# Patient Record
Sex: FEMALE | Race: WHITE | Employment: UNEMPLOYED | URBAN - METROPOLITAN AREA
[De-identification: names, ages, dates, MRNs, and addresses within clinical notes are randomized per-mention and may not be internally consistent; named-entity substitution may affect disease eponyms.]

---

## 2018-01-02 ENCOUNTER — ED HISTORICAL/CONVERTED ENCOUNTER (OUTPATIENT)
Dept: OTHER | Age: 38
End: 2018-01-02

## 2018-02-24 ENCOUNTER — ED HISTORICAL/CONVERTED ENCOUNTER (OUTPATIENT)
Dept: OTHER | Age: 38
End: 2018-02-24

## 2018-12-06 ENCOUNTER — ED HISTORICAL/CONVERTED ENCOUNTER (OUTPATIENT)
Dept: OTHER | Age: 38
End: 2018-12-06

## 2018-12-21 ENCOUNTER — ED HISTORICAL/CONVERTED ENCOUNTER (OUTPATIENT)
Dept: OTHER | Age: 38
End: 2018-12-21

## 2020-07-19 ENCOUNTER — ED HISTORICAL/CONVERTED ENCOUNTER (OUTPATIENT)
Dept: OTHER | Age: 40
End: 2020-07-19

## 2021-01-21 VITALS
WEIGHT: 255.6 LBS | BODY MASS INDEX: 42.59 KG/M2 | SYSTOLIC BLOOD PRESSURE: 130 MMHG | TEMPERATURE: 98.5 F | HEIGHT: 65 IN | DIASTOLIC BLOOD PRESSURE: 80 MMHG | OXYGEN SATURATION: 99 % | HEART RATE: 90 BPM | RESPIRATION RATE: 18 BRPM

## 2021-01-21 DIAGNOSIS — Z80.9 FAMILY HISTORY OF CANCER: ICD-10-CM

## 2021-01-21 PROBLEM — R03.0 ELEVATED BLOOD PRESSURE READING: Status: ACTIVE | Noted: 2021-01-21

## 2021-01-21 PROBLEM — Z78.9 WEIGHT LOSS ADVISED: Status: ACTIVE | Noted: 2021-01-21

## 2021-01-21 PROBLEM — Z87.81 HISTORY OF FRACTURE OF ANKLE: Status: ACTIVE | Noted: 2021-01-21

## 2021-01-21 PROBLEM — Z51.81 MEDICATION MONITORING ENCOUNTER: Status: ACTIVE | Noted: 2021-01-21

## 2021-01-21 PROBLEM — G47.10 DAYTIME HYPERSOMNIA: Status: ACTIVE | Noted: 2021-01-21

## 2021-01-21 PROBLEM — E55.9 VITAMIN D DEFICIENCY: Status: ACTIVE | Noted: 2021-01-21

## 2021-01-21 PROBLEM — R73.03 PREDIABETES: Status: ACTIVE | Noted: 2021-01-21

## 2021-01-21 PROBLEM — G47.00 INSOMNIA: Status: ACTIVE | Noted: 2021-01-21

## 2021-01-21 PROBLEM — E66.01 MORBID OBESITY (HCC): Status: ACTIVE | Noted: 2021-01-21

## 2021-01-21 RX ORDER — GINGER ROOT/GINGER ROOT EXT 262.5 MG
CAPSULE ORAL
COMMUNITY

## 2021-01-21 RX ORDER — NORGESTIMATE AND ETHINYL ESTRADIOL 0.25-0.035
1 KIT ORAL DAILY
COMMUNITY

## 2021-09-09 ENCOUNTER — HOSPITAL ENCOUNTER (EMERGENCY)
Age: 41
Discharge: HOME | End: 2021-09-10
Payer: COMMERCIAL

## 2021-09-09 ENCOUNTER — APPOINTMENT (OUTPATIENT)
Dept: GENERAL RADIOLOGY | Age: 41
End: 2021-09-09
Attending: EMERGENCY MEDICINE
Payer: COMMERCIAL

## 2021-09-09 VITALS
BODY MASS INDEX: 42.68 KG/M2 | TEMPERATURE: 99 F | SYSTOLIC BLOOD PRESSURE: 118 MMHG | HEIGHT: 64 IN | OXYGEN SATURATION: 96 % | HEART RATE: 118 BPM | WEIGHT: 250 LBS | DIASTOLIC BLOOD PRESSURE: 75 MMHG | RESPIRATION RATE: 22 BRPM

## 2021-09-09 DIAGNOSIS — U07.1 PNEUMONIA DUE TO COVID-19 VIRUS: Primary | ICD-10-CM

## 2021-09-09 DIAGNOSIS — J12.82 PNEUMONIA DUE TO COVID-19 VIRUS: Primary | ICD-10-CM

## 2021-09-09 LAB
ALBUMIN SERPL-MCNC: 3.5 G/DL (ref 3.5–5)
ALBUMIN/GLOB SERPL: 0.7 {RATIO} (ref 1.1–2.2)
ALP SERPL-CCNC: 72 U/L (ref 45–117)
ALT SERPL-CCNC: 66 U/L (ref 12–78)
ANION GAP SERPL CALC-SCNC: 7 MMOL/L (ref 5–15)
AST SERPL W P-5'-P-CCNC: 100 U/L (ref 15–37)
BASOPHILS # BLD: 0 K/UL (ref 0–0.1)
BASOPHILS NFR BLD: 1 % (ref 0–1)
BILIRUB SERPL-MCNC: 0.3 MG/DL (ref 0.2–1)
BUN SERPL-MCNC: 10 MG/DL (ref 6–20)
BUN/CREAT SERPL: 14 (ref 12–20)
CA-I BLD-MCNC: 8.2 MG/DL (ref 8.5–10.1)
CHLORIDE SERPL-SCNC: 99 MMOL/L (ref 97–108)
CO2 SERPL-SCNC: 28 MMOL/L (ref 21–32)
COVID-19 RAPID TEST, COVR: DETECTED
CREAT SERPL-MCNC: 0.74 MG/DL (ref 0.55–1.02)
DIFFERENTIAL METHOD BLD: ABNORMAL
EOSINOPHIL # BLD: 0 K/UL (ref 0–0.4)
EOSINOPHIL NFR BLD: 0 % (ref 0–7)
ERYTHROCYTE [DISTWIDTH] IN BLOOD BY AUTOMATED COUNT: 13.9 % (ref 11.5–14.5)
GLOBULIN SER CALC-MCNC: 4.7 G/DL (ref 2–4)
GLUCOSE SERPL-MCNC: 140 MG/DL (ref 65–100)
HCT VFR BLD AUTO: 43.9 % (ref 35–47)
HGB BLD-MCNC: 13.7 G/DL (ref 11.5–16)
IMM GRANULOCYTES # BLD AUTO: 0.1 K/UL (ref 0–0.04)
IMM GRANULOCYTES NFR BLD AUTO: 1 % (ref 0–0.5)
LACTATE SERPL-SCNC: 1.4 MMOL/L (ref 0.4–2)
LYMPHOCYTES # BLD: 0.8 K/UL (ref 0.8–3.5)
LYMPHOCYTES NFR BLD: 18 % (ref 12–49)
MCH RBC QN AUTO: 25.9 PG (ref 26–34)
MCHC RBC AUTO-ENTMCNC: 31.2 G/DL (ref 30–36.5)
MCV RBC AUTO: 83 FL (ref 80–99)
MONOCYTES # BLD: 0.4 K/UL (ref 0–1)
MONOCYTES NFR BLD: 8 % (ref 5–13)
NEUTS SEG # BLD: 3.2 K/UL (ref 1.8–8)
NEUTS SEG NFR BLD: 72 % (ref 32–75)
NRBC # BLD: 0 K/UL (ref 0–0.01)
NRBC BLD-RTO: 0 PER 100 WBC
PLATELET # BLD AUTO: 216 K/UL (ref 150–400)
PMV BLD AUTO: 12.1 FL (ref 8.9–12.9)
POTASSIUM SERPL-SCNC: 3.5 MMOL/L (ref 3.5–5.1)
PROT SERPL-MCNC: 8.2 G/DL (ref 6.4–8.2)
RBC # BLD AUTO: 5.29 M/UL (ref 3.8–5.2)
SARS-COV-2, COV2: NORMAL
SODIUM SERPL-SCNC: 134 MMOL/L (ref 136–145)
SPECIMEN SOURCE: ABNORMAL
TROPONIN I SERPL-MCNC: <0.05 NG/ML
WBC # BLD AUTO: 4.4 K/UL (ref 3.6–11)

## 2021-09-09 PROCEDURE — 85025 COMPLETE CBC W/AUTO DIFF WBC: CPT

## 2021-09-09 PROCEDURE — 74011250636 HC RX REV CODE- 250/636: Performed by: EMERGENCY MEDICINE

## 2021-09-09 PROCEDURE — 80053 COMPREHEN METABOLIC PANEL: CPT

## 2021-09-09 PROCEDURE — 83605 ASSAY OF LACTIC ACID: CPT

## 2021-09-09 PROCEDURE — 93005 ELECTROCARDIOGRAM TRACING: CPT

## 2021-09-09 PROCEDURE — 84484 ASSAY OF TROPONIN QUANT: CPT

## 2021-09-09 PROCEDURE — 71045 X-RAY EXAM CHEST 1 VIEW: CPT

## 2021-09-09 PROCEDURE — 74011250637 HC RX REV CODE- 250/637: Performed by: EMERGENCY MEDICINE

## 2021-09-09 PROCEDURE — 87040 BLOOD CULTURE FOR BACTERIA: CPT

## 2021-09-09 PROCEDURE — 99284 EMERGENCY DEPT VISIT MOD MDM: CPT

## 2021-09-09 PROCEDURE — 87635 SARS-COV-2 COVID-19 AMP PRB: CPT

## 2021-09-09 PROCEDURE — 36415 COLL VENOUS BLD VENIPUNCTURE: CPT

## 2021-09-09 RX ORDER — ALBUTEROL SULFATE 90 UG/1
2 AEROSOL, METERED RESPIRATORY (INHALATION)
Qty: 6.7 G | Refills: 0 | Status: SHIPPED | OUTPATIENT
Start: 2021-09-09 | End: 2021-09-10 | Stop reason: SDUPTHER

## 2021-09-09 RX ORDER — ACETAMINOPHEN 500 MG
1000 TABLET ORAL
Status: COMPLETED | OUTPATIENT
Start: 2021-09-09 | End: 2021-09-09

## 2021-09-09 RX ORDER — DEXAMETHASONE 6 MG/1
6 TABLET ORAL
Qty: 5 TABLET | Refills: 0 | Status: SHIPPED | OUTPATIENT
Start: 2021-09-09 | End: 2021-09-10 | Stop reason: SDUPTHER

## 2021-09-09 RX ORDER — LORATADINE AND PSEUDOEPHEDRINE SULFATE 10; 240 MG/1; MG/1
1 TABLET, EXTENDED RELEASE ORAL DAILY
Qty: 14 TABLET | Refills: 0 | Status: SHIPPED | OUTPATIENT
Start: 2021-09-09 | End: 2021-09-10 | Stop reason: SDUPTHER

## 2021-09-09 RX ORDER — PROMETHAZINE HYDROCHLORIDE AND DEXTROMETHORPHAN HYDROBROMIDE 6.25; 15 MG/5ML; MG/5ML
5 SYRUP ORAL
Qty: 120 ML | Refills: 0 | Status: SHIPPED | OUTPATIENT
Start: 2021-09-09 | End: 2021-09-10 | Stop reason: SDUPTHER

## 2021-09-09 RX ADMIN — ACETAMINOPHEN 1000 MG: 500 TABLET ORAL at 20:06

## 2021-09-09 RX ADMIN — SODIUM CHLORIDE 1000 ML: 9 INJECTION, SOLUTION INTRAVENOUS at 20:34

## 2021-09-09 NOTE — LETTER
[facility] EMERGENCY DEPT  [address] [address]  [phone]    Work/School Note    Date: 9/9/2021     To Whom It May concern:    Arron Gonsales was evaulated by the following provider(s):  Nurse Practitioner: Mihir Quintanilla NP.   Karli Hayden virus is suspected. Per the CDC guidelines we recommend home isolation until the following conditions are all met:    1. At least 10 days have passed since symptoms first appeared and  2. At least 24 hours have passed since last fever without the use of fever-reducing medications and  3.  Symptoms (e.g., cough, shortness of breath) have improved    Sincerely,        [facility] Hospital Staff

## 2021-09-09 NOTE — ED TRIAGE NOTES
Pt states sx started Friday lungs hurt everything hurts. Generalized pain everywhere with shortness of breath.

## 2021-09-10 LAB
ATRIAL RATE: 122 BPM
CALCULATED P AXIS, ECG09: 29 DEGREES
CALCULATED R AXIS, ECG10: -21 DEGREES
CALCULATED T AXIS, ECG11: 16 DEGREES
DIAGNOSIS, 93000: NORMAL
P-R INTERVAL, ECG05: 124 MS
Q-T INTERVAL, ECG07: 300 MS
QRS DURATION, ECG06: 90 MS
QTC CALCULATION (BEZET), ECG08: 427 MS
VENTRICULAR RATE, ECG03: 122 BPM

## 2021-09-10 RX ORDER — LORATADINE AND PSEUDOEPHEDRINE SULFATE 10; 240 MG/1; MG/1
1 TABLET, EXTENDED RELEASE ORAL DAILY
Qty: 14 TABLET | Refills: 0 | Status: SHIPPED | OUTPATIENT
Start: 2021-09-10

## 2021-09-10 RX ORDER — ALBUTEROL SULFATE 90 UG/1
2 AEROSOL, METERED RESPIRATORY (INHALATION)
Qty: 6.7 G | Refills: 0 | Status: SHIPPED | OUTPATIENT
Start: 2021-09-10

## 2021-09-10 RX ORDER — PROMETHAZINE HYDROCHLORIDE AND DEXTROMETHORPHAN HYDROBROMIDE 6.25; 15 MG/5ML; MG/5ML
5 SYRUP ORAL
Qty: 120 ML | Refills: 0 | Status: SHIPPED | OUTPATIENT
Start: 2021-09-10 | End: 2021-09-17

## 2021-09-10 RX ORDER — DEXAMETHASONE 6 MG/1
6 TABLET ORAL
Qty: 5 TABLET | Refills: 0 | Status: SHIPPED | OUTPATIENT
Start: 2021-09-10 | End: 2021-09-15

## 2021-09-10 NOTE — ED PROVIDER NOTES
EMERGENCY DEPARTMENT HISTORY AND PHYSICAL EXAM      Date: 9/9/2021  Patient Name: Mare Mackey    History of Presenting Illness     Chief Complaint   Patient presents with    Generalized Body Aches       History Provided By: Patient    HPI: Mare Mackey, 47 y.o. female with a past medical history significant obesity and Sleep apnea, depression presents to the ED with cc of COVID-19. Patient's  came in contact with somebody that was positive for COVID-19 at work. Patient and her family has had symptoms since Friday. Patient has been taking over-the-counter cold medicine without relief. Moderate severity, no known exacerbating or relieving factors, no other associated signs and symptoms    There are no other complaints, changes, or physical findings at this time. PCP: None    No current facility-administered medications on file prior to encounter. Current Outpatient Medications on File Prior to Encounter   Medication Sig Dispense Refill    cholecalciferol, vitamin D3, 125 mcg/0.5 mL (5K unit/0.5mL) drop Take  by mouth.  norgestimate-ethinyl estradioL (Sprintec, 28,) 0.25-35 mg-mcg tab Take 1 Tab by mouth daily. Past History     Past Medical History:  Past Medical History:   Diagnosis Date    Depression     Sleep apnea        Past Surgical History:  History reviewed. No pertinent surgical history. Family History:  History reviewed. No pertinent family history. Social History:  Social History     Tobacco Use    Smoking status: Never Smoker    Smokeless tobacco: Never Used   Substance Use Topics    Alcohol use: Never    Drug use: Never       Allergies: Allergies   Allergen Reactions    Amoxicillin Nausea and Vomiting    Penicillins Nausea and Vomiting         Review of Systems     Review of Systems   Constitutional: Positive for chills, fatigue and fever. HENT: Negative for congestion, sinus pressure and trouble swallowing. Eyes: Negative for photophobia and pain. Respiratory: Positive for cough and shortness of breath. Cardiovascular: Negative for chest pain and leg swelling. Gastrointestinal: Negative for abdominal pain, diarrhea, nausea and vomiting. Endocrine: Negative for polydipsia, polyphagia and polyuria. Genitourinary: Negative for decreased urine volume, difficulty urinating, dysuria, hematuria and urgency. Musculoskeletal: Negative for back pain, gait problem, myalgias and neck pain. Skin: Negative for pallor and rash. Allergic/Immunologic: Negative for environmental allergies and food allergies. Neurological: Negative for dizziness, facial asymmetry, speech difficulty, numbness and headaches. Hematological: Negative for adenopathy. Does not bruise/bleed easily. Psychiatric/Behavioral: Negative for agitation, self-injury and suicidal ideas. The patient is not nervous/anxious. Physical Exam     Physical Exam  Vitals and nursing note reviewed. Constitutional:       General: She is in acute distress. Appearance: She is obese. She is ill-appearing. HENT:      Head: Atraumatic. Right Ear: Tympanic membrane and external ear normal.      Left Ear: Tympanic membrane and external ear normal.      Nose: Nose normal.      Mouth/Throat:      Mouth: Mucous membranes are moist.   Eyes:      Extraocular Movements: Extraocular movements intact. Pupils: Pupils are equal, round, and reactive to light. Cardiovascular:      Rate and Rhythm: Regular rhythm. Tachycardia present. Pulses: Normal pulses. Heart sounds: Normal heart sounds. Pulmonary:      Effort: No respiratory distress. Breath sounds: Normal breath sounds. Chest:      Chest wall: No tenderness. Abdominal:      General: Abdomen is flat. Palpations: Abdomen is soft. Musculoskeletal:         General: Normal range of motion. Cervical back: Normal range of motion and neck supple. Skin:     General: Skin is warm and dry.       Capillary Refill: Capillary refill takes less than 2 seconds. Neurological:      General: No focal deficit present. Mental Status: She is alert and oriented to person, place, and time. Mental status is at baseline. Psychiatric:         Mood and Affect: Mood normal.         Behavior: Behavior normal.         Lab and Diagnostic Study Results     Labs -     Recent Results (from the past 12 hour(s))   CBC WITH AUTOMATED DIFF    Collection Time: 09/09/21  8:15 PM   Result Value Ref Range    WBC 4.4 3.6 - 11.0 K/uL    RBC 5.29 (H) 3.80 - 5.20 M/uL    HGB 13.7 11.5 - 16.0 g/dL    HCT 43.9 35.0 - 47.0 %    MCV 83.0 80.0 - 99.0 FL    MCH 25.9 (L) 26.0 - 34.0 PG    MCHC 31.2 30.0 - 36.5 g/dL    RDW 13.9 11.5 - 14.5 %    PLATELET 825 755 - 656 K/uL    MPV 12.1 8.9 - 12.9 FL    NRBC 0.0 0.0  WBC    ABSOLUTE NRBC 0.00 0.00 - 0.01 K/uL    NEUTROPHILS 72 32 - 75 %    LYMPHOCYTES 18 12 - 49 %    MONOCYTES 8 5 - 13 %    EOSINOPHILS 0 0 - 7 %    BASOPHILS 1 0 - 1 %    IMMATURE GRANULOCYTES 1 (H) 0 - 0.5 %    ABS. NEUTROPHILS 3.2 1.8 - 8.0 K/UL    ABS. LYMPHOCYTES 0.8 0.8 - 3.5 K/UL    ABS. MONOCYTES 0.4 0.0 - 1.0 K/UL    ABS. EOSINOPHILS 0.0 0.0 - 0.4 K/UL    ABS. BASOPHILS 0.0 0.0 - 0.1 K/UL    ABS. IMM. GRANS. 0.1 (H) 0.00 - 0.04 K/UL    DF AUTOMATED     METABOLIC PANEL, COMPREHENSIVE    Collection Time: 09/09/21  8:15 PM   Result Value Ref Range    Sodium 134 (L) 136 - 145 mmol/L    Potassium 3.5 3.5 - 5.1 mmol/L    Chloride 99 97 - 108 mmol/L    CO2 28 21 - 32 mmol/L    Anion gap 7 5 - 15 mmol/L    Glucose 140 (H) 65 - 100 mg/dL    BUN 10 6 - 20 mg/dL    Creatinine 0.74 0.55 - 1.02 mg/dL    BUN/Creatinine ratio 14 12 - 20      GFR est AA >60 >60 ml/min/1.73m2    GFR est non-AA >60 >60 ml/min/1.73m2    Calcium 8.2 (L) 8.5 - 10.1 mg/dL    Bilirubin, total 0.3 0.2 - 1.0 mg/dL    AST (SGOT) 100 (H) 15 - 37 U/L    ALT (SGPT) 66 12 - 78 U/L    Alk.  phosphatase 72 45 - 117 U/L    Protein, total 8.2 6.4 - 8.2 g/dL    Albumin 3.5 3.5 - 5.0 g/dL    Globulin 4.7 (H) 2.0 - 4.0 g/dL    A-G Ratio 0.7 (L) 1.1 - 2.2     LACTIC ACID    Collection Time: 09/09/21  8:15 PM   Result Value Ref Range    Lactic acid 1.4 0.4 - 2.0 mmol/L   TROPONIN I    Collection Time: 09/09/21  8:15 PM   Result Value Ref Range    Troponin-I, Qt. <0.05 <0.05 ng/mL   COVID-19 RAPID TEST    Collection Time: 09/09/21  8:30 PM   Result Value Ref Range    Specimen source Nasopharyngeal      COVID-19 rapid test DETECTED (A) Not Detected     SARS-COV-2    Collection Time: 09/09/21  8:30 PM   Result Value Ref Range    SARS-CoV-2 Please find results under separate order         Radiologic Studies -   @[email or web address]@  CT Results  (Last 48 hours)    None        CXR Results  (Last 48 hours)               09/09/21 2040  XR CHEST SNGL V Final result    Impression:  :    1. Mild bilateral patchy airspace disease consistent with pneumonia, potentially   Covid pneumonia. Recommend follow-up to assess for resolution. Narrative:  Single View Chest 9/9/2021       Comparison: December 6, 2018       Indication: Possible Covid. Findings:   Low lung volumes, accentuating the cardiac silhouette. There is mild bilateral   patchy opacities consistent with pneumonia. No pleural effusion or vianey edema. No pneumothorax. Medical Decision Making   - I am the first provider for this patient. - I reviewed the vital signs, available nursing notes, past medical history, past surgical history, family history and social history. - Initial assessment performed. The patients presenting problems have been discussed, and they are in agreement with the care plan formulated and outlined with them. I have encouraged them to ask questions as they arise throughout their visit. Vital Signs-Reviewed the patient's vital signs.   Patient Vitals for the past 12 hrs:   Temp Pulse Resp BP SpO2   09/09/21 2303 99 °F (37.2 °C) (!) 118 22 118/75 96 %   09/09/21 1948 (!) 102.2 °F (39 °C) (!) 124 25 108/68 94 %       Records Reviewed: Nursing Notes and Old Medical Records          ED Course:          Provider Notes (Medical Decision Making):   Pt presents with acute URI symptoms including nasal congestion, rhinorrhea and sore throat. Pt also has c/o of cough without dyspnea, chest pain or wheezing. Pt is well-appearing with stable vitals and benign exam; symptoms are consistent with an uncomplicated URI. DDx: acute bronchitis, COVID-19, bacterial sinusitis vs. pharyngitis, migraine, flu. Symptomatic therapy suggested: acetaminophen, ibuprofen, antihistamine-decongestant of choice, cough suppressant of choice. Increase fluids, use vaporizer, stay in steamy bathroom tid 15 min prn severe cough, tylenol as needed, rest, avoid smoky areas. Lack of antibiotic effectiveness discussed with her. Symptomatic therapy suggested: gargle for sore throat, use mist at bedside for congestion. Apply facial warm packs for sinus pain or use nasal saline sprays. Follow up prn if not better in 72 hours. MDM       Procedures   Medical Decision Makingedical Decision Making  Performed by: Warden Alisha NP  PROCEDURES:  Procedures       Disposition   Disposition: DC- Adult Discharges: All of the diagnostic tests were reviewed and questions answered. Diagnosis, care plan and treatment options were discussed. The patient understands the instructions and will follow up as directed. The patients results have been reviewed with them. They have been counseled regarding their diagnosis. The patient verbally convey understanding and agreement of the signs, symptoms, diagnosis, treatment and prognosis and additionally agrees to follow up as recommended with their PCP in 24 - 48 hours. They also agree with the care-plan and convey that all of their questions have been answered.   I have also put together some discharge instructions for them that include: 1) educational information regarding their diagnosis, 2) how to care for their diagnosis at home, as well a 3) list of reasons why they would want to return to the ED prior to their follow-up appointment, should their condition change. Discharged    DISCHARGE PLAN:  1. Current Discharge Medication List      CONTINUE these medications which have NOT CHANGED    Details   cholecalciferol, vitamin D3, 125 mcg/0.5 mL (5K unit/0.5mL) drop Take  by mouth. norgestimate-ethinyl estradioL (Sprintec, 28,) 0.25-35 mg-mcg tab Take 1 Tab by mouth daily. 2.   Follow-up Information     Follow up With Specialties Details Why Contact Info    Your PCP        Roselynn Gilford, MD Family Medicine Schedule an appointment as soon as possible for a visit   [address] [place] [address]  [phone]          3. Return to ED if worse   4. Discharge Medication List as of 9/9/2021 11:51 PM      START taking these medications    Details   promethazine-dextromethorphan (PROMETHAZINE-DM) 6.25-15 mg/5 mL syrup Take 5 mL by mouth every four (4) hours as needed for Cough for up to 7 days. , Normal, Disp-120 mL, R-0      dexAMETHasone (Decadron) 6 mg tablet Take 1 Tablet by mouth Daily (before breakfast) for 5 days. , Normal, Disp-5 Tablet, R-0      loratadine-pseudoephedrine (Claritin-D 24 Hour)  mg per tablet Take 1 Tablet by mouth daily. , Normal, Disp-14 Tablet, R-0      albuterol (Ventolin HFA) 90 mcg/actuation inhaler Take 2 Puffs by inhalation every four (4) hours as needed for Wheezing., Normal, Disp-6.7 g, R-0         CONTINUE these medications which have NOT CHANGED    Details   cholecalciferol, vitamin D3, 125 mcg/0.5 mL (5K unit/0.5mL) drop Take  by mouth., Historical Med      norgestimate-ethinyl estradioL (Sprintec, 28,) 0.25-35 mg-mcg tab Take 1 Tab by mouth daily. , Historical Med               Diagnosis     Clinical Impression:   1.  Pneumonia due to COVID-19 virus        Attestations:    Yung Mejia NP    Please note that this dictation was completed with Eric the computer voice recognition software. Quite often unanticipated grammatical, syntax, homophones, and other interpretive errors are inadvertently transcribed by the computer software. Please disregard these errors. Please excuse any errors that have escaped final proofreading. Thank you.

## 2021-09-17 LAB
BACTERIA SPEC CULT: NORMAL
SPECIAL REQUESTS,SREQ: NORMAL

## 2022-03-18 PROBLEM — G47.10 DAYTIME HYPERSOMNIA: Status: ACTIVE | Noted: 2021-01-21

## 2022-03-18 PROBLEM — Z51.81 MEDICATION MONITORING ENCOUNTER: Status: ACTIVE | Noted: 2021-01-21

## 2022-03-18 PROBLEM — Z87.81 HISTORY OF FRACTURE OF ANKLE: Status: ACTIVE | Noted: 2021-01-21

## 2022-03-19 PROBLEM — Z80.9 FAMILY HISTORY OF CANCER: Status: ACTIVE | Noted: 2021-01-21

## 2022-03-19 PROBLEM — Z78.9 WEIGHT LOSS ADVISED: Status: ACTIVE | Noted: 2021-01-21

## 2022-03-19 PROBLEM — R03.0 ELEVATED BLOOD PRESSURE READING: Status: ACTIVE | Noted: 2021-01-21

## 2022-03-19 PROBLEM — E55.9 VITAMIN D DEFICIENCY: Status: ACTIVE | Noted: 2021-01-21

## 2022-03-19 PROBLEM — E66.01 MORBID OBESITY (HCC): Status: ACTIVE | Noted: 2021-01-21

## 2022-03-19 PROBLEM — R73.03 PREDIABETES: Status: ACTIVE | Noted: 2021-01-21

## 2022-03-19 PROBLEM — G47.00 INSOMNIA: Status: ACTIVE | Noted: 2021-01-21

## 2022-05-24 PROBLEM — Z11.59 NEED FOR HEPATITIS C SCREENING TEST: Status: ACTIVE | Noted: 2022-05-24

## 2022-06-23 PROBLEM — Z11.59 NEED FOR HEPATITIS C SCREENING TEST: Status: RESOLVED | Noted: 2022-05-24 | Resolved: 2022-06-23

## 2022-07-23 PROBLEM — R73.09 ABNORMAL GLUCOSE: Status: ACTIVE | Noted: 2022-07-23

## 2022-07-23 PROBLEM — R73.03 PREDIABETES: Status: RESOLVED | Noted: 2021-01-21 | Resolved: 2022-07-23

## 2022-07-23 PROBLEM — Z12.4 CERVICAL CANCER SCREENING: Status: ACTIVE | Noted: 2022-07-23

## 2022-08-22 PROBLEM — Z12.4 CERVICAL CANCER SCREENING: Status: RESOLVED | Noted: 2022-07-23 | Resolved: 2022-08-22

## 2022-11-17 ENCOUNTER — TELEPHONE (OUTPATIENT)
Dept: INTERNAL MEDICINE CLINIC | Age: 42
End: 2022-11-17

## 2022-11-17 NOTE — TELEPHONE ENCOUNTER
Called patient and stated that Dr. Carolynn Grady first available appointment is in March. She stated she needed to check her work schedule to see what date she can come in.

## 2022-11-17 NOTE — TELEPHONE ENCOUNTER
----- Message from Yvonne Taylor sent at 11/15/2022 12:52 PM EST -----  Subject: Appointment Request    Reason for Call: Established Patient Appointment needed: Routine Existing   Condition Follow Up    QUESTIONS    Reason for appointment request? No appointments available during search     Additional Information for Provider? Due for a follow up for weight gain. No appt available.  Please call patient  ---------------------------------------------------------------------------  --------------  [address] [employer]  [phone]; OK to leave message on voicemail  ---------------------------------------------------------------------------  --------------  SCRIPT ANSWERS  COVID Screen: Karin George

## 2023-03-20 ENCOUNTER — HOSPITAL ENCOUNTER (EMERGENCY)
Age: 43
Discharge: HOME | End: 2023-03-20
Attending: STUDENT IN AN ORGANIZED HEALTH CARE EDUCATION/TRAINING PROGRAM
Payer: COMMERCIAL

## 2023-03-20 ENCOUNTER — TELEPHONE (OUTPATIENT)
Dept: INTERNAL MEDICINE CLINIC | Age: 43
End: 2023-03-20

## 2023-03-20 VITALS
SYSTOLIC BLOOD PRESSURE: 148 MMHG | RESPIRATION RATE: 18 BRPM | OXYGEN SATURATION: 98 % | TEMPERATURE: 98.3 F | HEART RATE: 99 BPM | HEIGHT: 64 IN | WEIGHT: 250 LBS | DIASTOLIC BLOOD PRESSURE: 94 MMHG | BODY MASS INDEX: 42.68 KG/M2

## 2023-03-20 DIAGNOSIS — R73.9 HYPERGLYCEMIA: Primary | ICD-10-CM

## 2023-03-20 LAB
ALBUMIN SERPL-MCNC: 3.4 G/DL (ref 3.5–5)
ALBUMIN/GLOB SERPL: 0.9 (ref 1.1–2.2)
ALP SERPL-CCNC: 108 U/L (ref 45–117)
ALT SERPL-CCNC: 34 U/L (ref 12–78)
ANION GAP SERPL CALC-SCNC: 6 MMOL/L (ref 5–15)
AST SERPL W P-5'-P-CCNC: 21 U/L (ref 15–37)
BASOPHILS # BLD: 0.1 K/UL (ref 0–0.1)
BASOPHILS NFR BLD: 1 % (ref 0–1)
BILIRUB SERPL-MCNC: 0.3 MG/DL (ref 0.2–1)
BUN SERPL-MCNC: 10 MG/DL (ref 6–20)
BUN/CREAT SERPL: 18 (ref 12–20)
CA-I BLD-MCNC: 8.8 MG/DL (ref 8.5–10.1)
CHLORIDE SERPL-SCNC: 104 MMOL/L (ref 97–108)
CO2 SERPL-SCNC: 25 MMOL/L (ref 21–32)
CREAT SERPL-MCNC: 0.55 MG/DL (ref 0.55–1.02)
DIFFERENTIAL METHOD BLD: ABNORMAL
EOSINOPHIL # BLD: 0.1 K/UL (ref 0–0.4)
EOSINOPHIL NFR BLD: 2 % (ref 0–7)
ERYTHROCYTE [DISTWIDTH] IN BLOOD BY AUTOMATED COUNT: 13.7 % (ref 11.5–14.5)
GLOBULIN SER CALC-MCNC: 3.8 G/DL (ref 2–4)
GLUCOSE BLD STRIP.AUTO-MCNC: 213 MG/DL (ref 65–100)
GLUCOSE SERPL-MCNC: 201 MG/DL (ref 65–100)
HCT VFR BLD AUTO: 39.5 % (ref 35–47)
HGB BLD-MCNC: 12.6 G/DL (ref 11.5–16)
IMM GRANULOCYTES # BLD AUTO: 0.1 K/UL (ref 0–0.04)
IMM GRANULOCYTES NFR BLD AUTO: 1 % (ref 0–0.5)
LYMPHOCYTES # BLD: 2.6 K/UL (ref 0.8–3.5)
LYMPHOCYTES NFR BLD: 28 % (ref 12–49)
MCH RBC QN AUTO: 25.4 PG (ref 26–34)
MCHC RBC AUTO-ENTMCNC: 31.9 G/DL (ref 30–36.5)
MCV RBC AUTO: 79.5 FL (ref 80–99)
MONOCYTES # BLD: 0.5 K/UL (ref 0–1)
MONOCYTES NFR BLD: 6 % (ref 5–13)
NEUTS SEG # BLD: 5.8 K/UL (ref 1.8–8)
NEUTS SEG NFR BLD: 62 % (ref 32–75)
NRBC # BLD: 0 K/UL (ref 0–0.01)
NRBC BLD-RTO: 0 PER 100 WBC
PERFORMED BY, TECHID: ABNORMAL
PLATELET # BLD AUTO: 272 K/UL (ref 150–400)
PMV BLD AUTO: 11.5 FL (ref 8.9–12.9)
POTASSIUM SERPL-SCNC: 3.9 MMOL/L (ref 3.5–5.1)
PROT SERPL-MCNC: 7.2 G/DL (ref 6.4–8.2)
RBC # BLD AUTO: 4.97 M/UL (ref 3.8–5.2)
SODIUM SERPL-SCNC: 135 MMOL/L (ref 136–145)
WBC # BLD AUTO: 9.3 K/UL (ref 3.6–11)

## 2023-03-20 PROCEDURE — 80053 COMPREHEN METABOLIC PANEL: CPT

## 2023-03-20 PROCEDURE — 99284 EMERGENCY DEPT VISIT MOD MDM: CPT

## 2023-03-20 PROCEDURE — 74011250636 HC RX REV CODE- 250/636: Performed by: STUDENT IN AN ORGANIZED HEALTH CARE EDUCATION/TRAINING PROGRAM

## 2023-03-20 PROCEDURE — 82962 GLUCOSE BLOOD TEST: CPT

## 2023-03-20 PROCEDURE — 85025 COMPLETE CBC W/AUTO DIFF WBC: CPT

## 2023-03-20 PROCEDURE — 36415 COLL VENOUS BLD VENIPUNCTURE: CPT

## 2023-03-20 RX ORDER — METFORMIN HYDROCHLORIDE 500 MG/1
500 TABLET ORAL 2 TIMES DAILY WITH MEALS
Qty: 60 TABLET | Refills: 0 | Status: SHIPPED | OUTPATIENT
Start: 2023-03-20 | End: 2023-03-24 | Stop reason: SDUPTHER

## 2023-03-20 RX ADMIN — SODIUM CHLORIDE 1000 ML: 9 INJECTION, SOLUTION INTRAVENOUS at 16:39

## 2023-03-20 NOTE — DISCHARGE INSTRUCTIONS
Thank you! Thank you for allowing me to care for you in the emergency department. I sincerely hope that you are satisfied with your visit today. It is my goal to provide you with excellent care. Below you will find a list of your labs and imaging from your visit today if applicable. Should you have any questions regarding these results please do not hesitate to call the emergency department. Please review [email or web address] for a more detailed result list since the below list may not be comprehensive. Instructions on how to sign up to [facility] should be provided in this packet. Labs -     Recent Results (from the past 12 hour(s))   GLUCOSE, POC    Collection Time: 03/20/23  3:16 PM   Result Value Ref Range    Glucose (POC) 213 (H) 65 - 100 mg/dL    Performed by Fabrizio Tang    CBC WITH AUTOMATED DIFF    Collection Time: 03/20/23  4:05 PM   Result Value Ref Range    WBC 9.3 3.6 - 11.0 K/uL    RBC 4.97 3.80 - 5.20 M/uL    HGB 12.6 11.5 - 16.0 g/dL    HCT 39.5 35.0 - 47.0 %    MCV 79.5 (L) 80.0 - 99.0 FL    MCH 25.4 (L) 26.0 - 34.0 PG    MCHC 31.9 30.0 - 36.5 g/dL    RDW 13.7 11.5 - 14.5 %    PLATELET 154 745 - 255 K/uL    MPV 11.5 8.9 - 12.9 FL    NRBC 0.0 0.0  WBC    ABSOLUTE NRBC 0.00 0.00 - 0.01 K/uL    NEUTROPHILS 62 32 - 75 %    LYMPHOCYTES 28 12 - 49 %    MONOCYTES 6 5 - 13 %    EOSINOPHILS 2 0 - 7 %    BASOPHILS 1 0 - 1 %    IMMATURE GRANULOCYTES 1 (H) 0 - 0.5 %    ABS. NEUTROPHILS 5.8 1.8 - 8.0 K/UL    ABS. LYMPHOCYTES 2.6 0.8 - 3.5 K/UL    ABS. MONOCYTES 0.5 0.0 - 1.0 K/UL    ABS. EOSINOPHILS 0.1 0.0 - 0.4 K/UL    ABS. BASOPHILS 0.1 0.0 - 0.1 K/UL    ABS. IMM.  GRANS. 0.1 (H) 0.00 - 0.04 K/UL    DF AUTOMATED     METABOLIC PANEL, COMPREHENSIVE    Collection Time: 03/20/23  4:05 PM   Result Value Ref Range    Sodium 135 (L) 136 - 145 mmol/L    Potassium 3.9 3.5 - 5.1 mmol/L    Chloride 104 97 - 108 mmol/L    CO2 25 21 - 32 mmol/L    Anion gap 6 5 - 15 mmol/L    Glucose 201 (H) 65 - 100 mg/dL    BUN 10 6 - 20 mg/dL    Creatinine 0.55 0.55 - 1.02 mg/dL    BUN/Creatinine ratio 18 12 - 20      eGFR >60 >60 ml/min/1.73m2    Calcium 8.8 8.5 - 10.1 mg/dL    Bilirubin, total 0.3 0.2 - 1.0 mg/dL    AST (SGOT) 21 15 - 37 U/L    ALT (SGPT) 34 12 - 78 U/L    Alk. phosphatase 108 45 - 117 U/L    Protein, total 7.2 6.4 - 8.2 g/dL    Albumin 3.4 (L) 3.5 - 5.0 g/dL    Globulin 3.8 2.0 - 4.0 g/dL    A-G Ratio 0.9 (L) 1.1 - 2.2         Radiologic Studies -   No orders to display     CT Results  (Last 48 hours)      None          CXR Results  (Last 48 hours)      None               If you feel that you have not received excellent quality care or timely care, please ask to speak to the nurse manager. Please choose us in the future for your continued health care needs. ------------------------------------------------------------------------------------------------------------  The exam and treatment you received in the Emergency Department were for an urgent problem and are not intended as complete care. It is very important that you follow-up with a doctor, nurse practitioner, or physician assistant in a timely manner to:  (1) confirm your diagnosis and review all imaging and lab results,  (2) re-evaluation of changes in your illness and treatment, and  (3) for ongoing care. If your symptoms become worse or you do not improve as expected and you are unable to reach your usual health care provider, you should return to the Emergency Department. We are available 24 hours a day. Please take your discharge instructions with you when you go to your follow-up appointment. If a prescription has been provided, please have it filled as soon as possible to prevent a delay in treatment. Read the entire medication instruction sheet provided to you by the pharmacy.  If you have any questions or reservations about taking the medication due to side effects or interactions with other medications, please call your primary care physician or contact the ER to speak with the charge nurse. Make an appointment with your family doctor or the physician you were referred to for follow-up of this visit as instructed on your discharge paperwork, as this is a mandatory follow-up. Return to the ER if you are unable to be seen or if you are unable to be seen in a timely manner. If you have any problem arranging the follow-up visit, contact the Emergency Department immediately.

## 2023-03-20 NOTE — ED TRIAGE NOTES
Patient states she was checking blood sugar with a friend glucose meter and sugar was above 300.  She spoke with PCP who advised her to come to ED

## 2023-03-20 NOTE — ED PROVIDER NOTES
[facility]  EMERGENCY DEPARTMENT ENCOUNTER NOTE    Date: 3/20/2023  Patient Name: Montserrat Dsouza    History of Presenting Illness     Chief Complaint   Patient presents with    High Blood Sugar       History obtained from: Patient    HPI: Montserrat Dsouza, 38 y.o. female with a past medical history and outpatient medications as listed and reviewed below  presents for high blood sugar. Patient reports she used her friend's glucometer to check her blood sugar at a [employer] and it was 450, this occurred about 5 days ago. She got her own glucometer and has been checking daily for about 5 days and it has been routinely about 300. She called her PCP to set up an appointment but they told her to come to the ED so she could set up an ED follow-up appointment for her PCP and be seen quicker. She currently denies any abdominal pain, nausea, vomiting, fever, chills, chest pain, shortness of breath. Polyuria and polydipsia are present for prolonged period of time. Glucose check by patient prior to arrival in 300s. Denies any headache, nausea, vomiting, chest pain, shortness of breath, or syncope. Denies any abdominal pain or acute back pain. Denies dysuria, hematuria, fever, chills, cough, URI symptoms, diarrhea, or constipation. No numbness or weakness in the upper or lower extremities or face. No dysarthria or facial droop. No imbalance or vertigo. Patient does not have any other complaints. Medical History   I reviewed the medical, surgical, family, and social history, as well as allergies:    PCP: Estrada Villar MD    Past Medical History:  Past Medical History:   Diagnosis Date    Depression     Sleep apnea      Past Surgical History:  No past surgical history on file.   Current Outpatient Medications:  Current Outpatient Medications   Medication Instructions    albuterol (Ventolin HFA) 90 mcg/actuation inhaler 2 Puffs, Inhalation, EVERY 4 HOURS AS NEEDED cholecalciferol, vitamin D3, 125 mcg/0.5 mL (5K unit/0.5mL) drop Oral    loratadine-pseudoephedrine (Claritin-D 24 Hour)  mg per tablet 1 Tablet, Oral, DAILY    metFORMIN (GLUCOPHAGE) 500 mg, Oral, 2 TIMES DAILY WITH MEALS    norgestimate-ethinyl estradioL (Sprintec, 28,) 0.25-35 mg-mcg tab 1 Tablet, Oral, DAILY      Family History:  No family history on file. Social History:  Social History     Tobacco Use    Smoking status: Never    Smokeless tobacco: Never   Substance Use Topics    Alcohol use: Never    Drug use: Never     Allergies: Allergies   Allergen Reactions    Amoxicillin Nausea and Vomiting    Penicillins Nausea and Vomiting       Review of Systems     Positives and pertinent negatives as per HPI, otherwise negative ROS. Physical Exam and Vital Signs   Vital Signs - Reviewed the patient's vital signs. Patient Vitals for the past 12 hrs:   Temp Pulse Resp BP SpO2   03/20/23 1512 98.3 °F (36.8 °C) 99 18 (!) 148/94 98 %     Physical Exam:    GENERAL: awake, alert, cooperative, not in distress  HEENT:  * Pupils equal, EOMI  * Head atraumatic  CV:  * audible heart sounds  * warm and perfused extremities bilaterally  PULMONARY: Good air movement, no wheezes, no crackles  ABDOMEN/: soft, no distension, no guarding, no abdominal tenderness  EXTREMITIES/BACK: warm and perfused, no tenderness, no edema  SKIN: no rashes or signs of trauma  NEURO:  * Speech clear  * Moves U&LE to command    Medical Decision Making     Patient is a 38 y.o. female presenting for elevated blood glucose. Vitals reveal no significant abnormalities and physical exam reveals no significant abnormalities. Based on the history, physical exam, risk factors, and vital signs, differential includes: Asymptomatic hyperglycemia, DKA, dehydration, electrolyte abnormalities. No concern for cerebral edema as the patient does not have any headaches or neurologic symptoms or signs or physical exam findings.   I doubt ACS or CHF as the patient does not have any chest pain or signs or symptoms of heart failure. Patient otherwise is asymptomatic thus I doubt any other process to suggest  infectious process as the cause of hyperglycemia. Picture of hyperglycemia due to new diagnosis of DM    See ED Course and Reassessment for results and interpretations. Records Reviewed: Nursing Notes  Social Determinants of health affecting management: None    ED Course and Reassessment     ED Course: Work is unremarkable, patient continues to feel well there is no evidence of DKA or HHS. Will send with prescription for metformin and have the patient follow-up with her PCP. She will arrange an appointment. Reassessment:    Understanding was insured that at this time there is no evidence for a more malignant underlying process, but that early in the process of an illness, an emergency department workup can be falsely reassuring. Routine discharge counseling was given including the fact that any worsening, changing or persistent symptoms should prompt an immediate call or follow up with their primary physician or the emergency department. The importance of appropriate follow up was also discussed. More extensive discharge instructions were given in the patient's discharge paperwork. After completion of evaluation and discussion of results and diagnoses, all the questions were answered. If required, all follow up appointments and treatments were discussed and explained. Understanding was insured prior to discharge. Diagnosis     Clinical Impression:   1. Hyperglycemia        Final Disposition     DISCHARGED FROM EMERGENCY DEPARTMENT    Patient will be discharged from the Emergency Department in stable condition. All of the diagnostic tests were reviewed and any questions were answered. Diagnosis, results, follow up if applicable, and return precautions were discussed.  I have also put together printed discharge instructions for them that include: 1) educational information regarding their diagnosis, 2) how to care for their diagnosis at home, as well a 3) list of reasons why they would want to return to the ED prior to their follow-up appointment, should their condition change. Any labs or imaging done in the ED will be either printed with the discharge paperwork or available through [address] DISCHARGE PLAN:  1. Current Discharge Medication List        CONTINUE these medications which have NOT CHANGED    Details   albuterol (Ventolin HFA) 90 mcg/actuation inhaler Take 2 Puffs by inhalation every four (4) hours as needed for Wheezing. Qty: 6.7 g, Refills: 0      loratadine-pseudoephedrine (Claritin-D 24 Hour)  mg per tablet Take 1 Tablet by mouth daily. Indications: allergic conjunctivitis  Qty: 14 Tablet, Refills: 0      cholecalciferol, vitamin D3, 125 mcg/0.5 mL (5K unit/0.5mL) drop Take  by mouth. norgestimate-ethinyl estradioL (Sprintec, 28,) 0.25-35 mg-mcg tab Take 1 Tab by mouth daily. 2.   Follow-up Information       Follow up With Specialties Details Why Contact Info    Viviane Devlin MD Internal Medicine Physician Call in 1 day  [address] [facility] [address] [place] [address]  [phone]            3. Return to ED if worse    4. Discharge Medication List as of 3/20/2023  4:44 PM        START taking these medications    Details   metFORMIN (GLUCOPHAGE) 500 mg tablet Take 1 Tablet by mouth two (2) times daily (with meals) for 30 days. , Normal, Disp-60 Tablet, R-0           CONTINUE these medications which have NOT CHANGED    Details   albuterol (Ventolin HFA) 90 mcg/actuation inhaler Take 2 Puffs by inhalation every four (4) hours as needed for Wheezing., Normal, Disp-6.7 g, R-0      loratadine-pseudoephedrine (Claritin-D 24 Hour)  mg per tablet Take 1 Tablet by mouth daily.  Indications: allergic conjunctivitis, Normal, Disp-14 Tablet, R-0      cholecalciferol, vitamin D3, 125 mcg/0.5 mL (5K unit/0.5mL) drop Take  by mouth., Historical Med      norgestimate-ethinyl estradioL (Sprintec, 28,) 0.25-35 mg-mcg tab Take 1 Tab by mouth daily. , Historical Med             Procedures, Critical Care, & Clinical Tools   Performed by: Patria Pena MD  Procedures          Results, Consults, Medications     Consults:  None   Labs:  Recent Results (from the past 12 hour(s))   GLUCOSE, POC    Collection Time: 03/20/23  3:16 PM   Result Value Ref Range    Glucose (POC) 213 (H) 65 - 100 mg/dL    Performed by Pk Randall    CBC WITH AUTOMATED DIFF    Collection Time: 03/20/23  4:05 PM   Result Value Ref Range    WBC 9.3 3.6 - 11.0 K/uL    RBC 4.97 3.80 - 5.20 M/uL    HGB 12.6 11.5 - 16.0 g/dL    HCT 39.5 35.0 - 47.0 %    MCV 79.5 (L) 80.0 - 99.0 FL    MCH 25.4 (L) 26.0 - 34.0 PG    MCHC 31.9 30.0 - 36.5 g/dL    RDW 13.7 11.5 - 14.5 %    PLATELET 198 301 - 632 K/uL    MPV 11.5 8.9 - 12.9 FL    NRBC 0.0 0.0  WBC    ABSOLUTE NRBC 0.00 0.00 - 0.01 K/uL    NEUTROPHILS 62 32 - 75 %    LYMPHOCYTES 28 12 - 49 %    MONOCYTES 6 5 - 13 %    EOSINOPHILS 2 0 - 7 %    BASOPHILS 1 0 - 1 %    IMMATURE GRANULOCYTES 1 (H) 0 - 0.5 %    ABS. NEUTROPHILS 5.8 1.8 - 8.0 K/UL    ABS. LYMPHOCYTES 2.6 0.8 - 3.5 K/UL    ABS. MONOCYTES 0.5 0.0 - 1.0 K/UL    ABS. EOSINOPHILS 0.1 0.0 - 0.4 K/UL    ABS. BASOPHILS 0.1 0.0 - 0.1 K/UL    ABS. IMM. GRANS. 0.1 (H) 0.00 - 0.04 K/UL    DF AUTOMATED     METABOLIC PANEL, COMPREHENSIVE    Collection Time: 03/20/23  4:05 PM   Result Value Ref Range    Sodium 135 (L) 136 - 145 mmol/L    Potassium 3.9 3.5 - 5.1 mmol/L    Chloride 104 97 - 108 mmol/L    CO2 25 21 - 32 mmol/L    Anion gap 6 5 - 15 mmol/L    Glucose 201 (H) 65 - 100 mg/dL    BUN 10 6 - 20 mg/dL    Creatinine 0.55 0.55 - 1.02 mg/dL    BUN/Creatinine ratio 18 12 - 20      eGFR >60 >60 ml/min/1.73m2    Calcium 8.8 8.5 - 10.1 mg/dL    Bilirubin, total 0.3 0.2 - 1.0 mg/dL    AST (SGOT) 21 15 - 37 U/L    ALT (SGPT) 34 12 - 78 U/L    Alk. phosphatase 108 45 - 117 U/L    Protein, total 7.2 6.4 - 8.2 g/dL    Albumin 3.4 (L) 3.5 - 5.0 g/dL    Globulin 3.8 2.0 - 4.0 g/dL    A-G Ratio 0.9 (L) 1.1 - 2.2       Radiologic Studies:  CT Results  (Last 48 hours)      None          CXR Results  (Last 48 hours)      None          Medications ordered:  Medications   sodium chloride 0.9 % bolus infusion 1,000 mL (0 mL IntraVENous IV Completed 3/20/23 9719)       Documentation Comments   - I am the first and primary provider for this patient and am the primary provider of record. - Initial assessment performed. The patients presenting problems have been discussed, and the staff are in agreement with the care plan formulated and outlined with them. I have encouraged them to ask questions as they arise throughout their visit. - Available medical records, nursing notes, old EKGs, and EMS run sheets (if patient was EMS transported) were reviewed    Please note that this dictation was completed with [id number], the computer voice recognition software. Quite often unanticipated grammatical, syntax, homophones, and other interpretive errors are inadvertently transcribed by the computer software. Please disregard these errors. Please excuse any errors that have escaped final proofreading.

## 2023-03-20 NOTE — TELEPHONE ENCOUNTER
Pt called nurse triage line saying she has been feeling jittery lately and now she is starting to feel very thirsty. She has a friend that is diabetic and she checked her blood sugar and found it to be 347. She then purchased herself a meter at [facility] and started checking her blood sugar fasting, and this am it was 300. Pt was ask if she had ever been diagnosed as diabetic, she says no. She says her parents passed when she was young, so she is not familiar with their health history. After letting Barb SERRANO know symptoms she stated pt needs to go to ER. She is happy to see her as ER follow up. I called pt and let her know she should go to ER and why is important NOT to put it off. She verbalized understanding and stated she would go. I told her Kennedi SERRANO will follow up with her from the ER. Again pt verbalized understanding. Pt ask if it was ok to tell ER that Barb SERRANO was her pcp sending her and I told her yes.

## 2023-03-21 ENCOUNTER — OFFICE VISIT (OUTPATIENT)
Dept: INTERNAL MEDICINE CLINIC | Age: 43
End: 2023-03-21
Payer: COMMERCIAL

## 2023-03-21 VITALS
BODY MASS INDEX: 42.44 KG/M2 | HEIGHT: 64 IN | TEMPERATURE: 97.5 F | OXYGEN SATURATION: 98 % | HEART RATE: 94 BPM | DIASTOLIC BLOOD PRESSURE: 88 MMHG | WEIGHT: 248.6 LBS | SYSTOLIC BLOOD PRESSURE: 126 MMHG

## 2023-03-21 DIAGNOSIS — E11.65 UNCONTROLLED TYPE 2 DIABETES MELLITUS WITH HYPERGLYCEMIA (HCC): Primary | ICD-10-CM

## 2023-03-21 DIAGNOSIS — E55.9 VITAMIN D DEFICIENCY: ICD-10-CM

## 2023-03-21 DIAGNOSIS — Z11.59 NEED FOR HEPATITIS C SCREENING TEST: ICD-10-CM

## 2023-03-21 DIAGNOSIS — Z12.4 ENCOUNTER FOR SCREENING FOR CERVICAL CANCER: ICD-10-CM

## 2023-03-21 DIAGNOSIS — Z13.220 SCREENING FOR LIPID DISORDERS: ICD-10-CM

## 2023-03-21 LAB
GLUCOSE POC: 192 MG/DL
HBA1C MFR BLD HPLC: 8.2 %

## 2023-03-21 PROCEDURE — 83036 HEMOGLOBIN GLYCOSYLATED A1C: CPT | Performed by: INTERNAL MEDICINE

## 2023-03-21 PROCEDURE — 99203 OFFICE O/P NEW LOW 30 MIN: CPT | Performed by: INTERNAL MEDICINE

## 2023-03-21 PROCEDURE — 82962 GLUCOSE BLOOD TEST: CPT | Performed by: INTERNAL MEDICINE

## 2023-03-21 NOTE — PROGRESS NOTES
Queenie Rosario (: 1980) is a 38 y.o. female, established patient, here for evaluation of the following chief complaint(s):  ED Follow-up         ASSESSMENT/PLAN:  Below is the assessment and plan developed based on review of pertinent history, physical exam, labs, studies, and medications. 1. Uncontrolled type 2 diabetes mellitus with hyperglycemia (HCC)  -     AMB POC GLUCOSE BLOOD, BY GLUCOSE MONITORING DEVICE  -     AMB POC HEMOGLOBIN A1C  -     REFERRAL TO DIABETIC EDUCATION  -      DIABETES FOOT EXAM  -     TSH RFX ON ABNORMAL TO FREE T4  -     MICROALBUMIN, UR, RAND W/ MICROALB/CREAT RATIO  2. Vitamin D deficiency  -     VITAMIN D, 25 HYDROXY  3. Need for hepatitis C screening test  -     HEPATITIS C AB  4. Screening for lipid disorders  -     LIPID PANEL  5. Encounter for screening for cervical cancer  -     REFERRAL TO GYNECOLOGY    Hyperglycemia with new onset type 2 diabetes mellitus uncontrolled most likely due to dietary noncompliance and lack of exercise. Her BMI is 42.67. She randomly checked her blood sugar with glucometer and it was high and she visited ER yesterday she has not started taking metformin 500 mg twice a day with meals she says she is going to  from the pharmacy now recommended that first she start and start checking blood sugar at least 2-3 times a day before breakfast and 1 hour after lunch or dinner and or at bedtime at least minimum twice a day and fasting goal should be between  and 1 hour after eating blood sugar goal should be less than 180 and at bedtime it should be between  and low blood sugar is goal less than 75. Diabetic dietary education given. Brochures given. Refer her to diabetic education classes. Diabetic foot exam is normal dorsalis pedis palpable in both feet monofilament test is negative in both feet. Refer her to ophthalmologist.  Yesika Howell ordered. Reviewed her labs done in ER set up.   Today her hemoglobin A1c is 8.2% in the office. Her random blood sugar is 192 in the office. She has no depression. She says she is going to be enrolled in weight loss clinic again in the past she had joint but that she did not go and pursue for bariatric surgery. I do not have any records that when she visited before [facility] it might be nothing we will try to get the records. She agreed with my plan and she will bring the sugar log and she has already appointment on June 20. She has a pleasant personality. 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 She has no tingling numbness in both feet. Return in about 3 months (around 6/21/2023) for pl keep june 20 th appointment with me, diabetes, hypertension,cholesterol follow up. SUBJECTIVE/OBJECTIVE:  DIANA Barbour with pleasant personality came for follow-up having recent ER visit. She had a joint weight loss bariatric surgery program in the past before COVID and then she says that she never did the surgery and she is here because she was feeling jittery and she checked her blood sugar with her friend's glucometer who is diabetic and it was high and she checked with machine that she brought and it was running around 300-400 and she visited emergency room yesterday as per the advised by our physician assistant and today her hemoglobin A1c is 8.2% in the office and her random blood sugar is 192 in the office. She does not know what kind of diet she should eat but she does not drink regular soda or juice. Her blood pressure is controlled. She has no depression. She says she has called [facility] and she is going to enroll in weight loss program again. Initially she had some symptoms of increased thirst and jitteriness. She has been started on metformin she is going to  the refills. She is recommended to start metformin 500 mg twice a day with meal and she agreed.   Yesterday she had starch containing diet and she does not know anything about diabetic diet education given and how to check the blood sugar and fasting goal and random blood sugar goal and at bedtime blood sugar log and hypoglycemia explained. .    She is non-smoker. She takes care of her 4 children. Her BMI is 42.67. Referred her to gynecologist for Pap smear and cervical cancer screening test and referred her to ophthalmologist.  She has not done any Pap smear since long. Allergies   Allergen Reactions    Amoxicillin Nausea and Vomiting    Penicillins Nausea and Vomiting     Current Outpatient Medications   Medication Sig    metFORMIN (GLUCOPHAGE) 500 mg tablet Take 1 Tablet by mouth two (2) times daily (with meals) for 30 days. No current facility-administered medications for this visit. Past Medical History:   Diagnosis Date    Sleep apnea      History reviewed. No pertinent surgical history. Family History   Family history unknown: Yes     Social History     Tobacco Use   Smoking Status Never   Smokeless Tobacco Never     Review of Systems   HENT:  Negative for congestion and sore throat. Eyes:  Negative for blurred vision. Respiratory:  Negative for cough and wheezing. Cardiovascular: Negative. Gastrointestinal: Negative. Genitourinary: Negative. Musculoskeletal: Negative. Neurological:  Negative for dizziness, tingling, tremors, sensory change and headaches. Endo/Heme/Allergies:  Positive for polydipsia. Negative for environmental allergies. Does not bruise/bleed easily. Psychiatric/Behavioral: Negative. Vitals:    03/21/23 1405 03/21/23 1435   BP: 133/86 126/88   Pulse: 94    Temp: 97.5 °F (36.4 °C)    TempSrc: Temporal    SpO2: 98%    Weight: 248 lb 9.6 oz (112.8 kg)    Height: 5' 4\" (1.626 m)           Physical Exam  Vitals and nursing note reviewed. Constitutional:       General: She is not in acute distress. Appearance: Normal appearance. She is obese. She is not toxic-appearing.    Eyes: Pupils: Pupils are equal, round, and reactive to light. Cardiovascular:      Rate and Rhythm: Normal rate and regular rhythm. Pulses: Normal pulses. Heart sounds: Normal heart sounds. No murmur heard. Pulmonary:      Effort: Pulmonary effort is normal.      Breath sounds: Normal breath sounds. No wheezing or rhonchi. Abdominal:      General: Bowel sounds are normal. There is no distension. Palpations: Abdomen is soft. There is no mass. Tenderness: There is no abdominal tenderness. There is no guarding. Musculoskeletal:         General: No swelling or tenderness. Cervical back: Neck supple. Right lower leg: No edema. Left lower leg: No edema. Skin:     General: Skin is warm. Findings: No bruising. Neurological:      General: No focal deficit present. Mental Status: She is alert. Mental status is at baseline. Cranial Nerves: No cranial nerve deficit. Motor: No weakness. Gait: Gait normal.   Psychiatric:         Mood and Affect: Mood normal.         Behavior: Behavior normal.       An electronic signature was used to authenticate this note.   -- Giovany Asencio MD

## 2023-03-21 NOTE — PROGRESS NOTES
Chief Complaint   Patient presents with    ED Follow-up    Visit Vitals  /86 (BP 1 Location: Left upper arm, BP Patient Position: Sitting, BP Cuff Size: Adult)   Pulse 94   Temp 97.5 °F (36.4 °C) (Temporal)   Ht 5' 4\" (1.626 m)   Wt 248 lb 9.6 oz (112.8 kg)   SpO2 98%   BMI 42.67 kg/m²    1. Have you been to the ER, urgent care clinic since your last visit? Hospitalized since your last visit? Yes Where: [facility]    2. Have you seen or consulted any other health care providers outside of the [address] [place] [address] since your last visit? Include any pap smears or colon screening.  No

## 2023-03-22 ENCOUNTER — TELEPHONE (OUTPATIENT)
Dept: OBGYN CLINIC | Age: 43
End: 2023-03-22

## 2023-03-22 ENCOUNTER — TELEPHONE (OUTPATIENT)
Dept: INTERNAL MEDICINE CLINIC | Age: 43
End: 2023-03-22

## 2023-03-22 RX ORDER — INSULIN PUMP SYRINGE, 3 ML
EACH MISCELLANEOUS
Qty: 1 KIT | Refills: 1 | Status: SHIPPED | OUTPATIENT
Start: 2023-03-22 | End: 2023-03-24 | Stop reason: SDUPTHER

## 2023-03-22 RX ORDER — LANCETS
EACH MISCELLANEOUS
Qty: 100 EACH | Refills: 3 | Status: SHIPPED | OUTPATIENT
Start: 2023-03-22

## 2023-03-22 RX ORDER — ISOPROPYL ALCOHOL 70 ML/100ML
SWAB TOPICAL
Qty: 100 PAD | Refills: 3 | Status: SHIPPED | OUTPATIENT
Start: 2023-03-22 | End: 2023-03-24 | Stop reason: SDUPTHER

## 2023-03-22 RX ORDER — IBUPROFEN 200 MG
CAPSULE ORAL
Qty: 100 STRIP | Refills: 3 | Status: SHIPPED | OUTPATIENT
Start: 2023-03-22 | End: 2023-03-24 | Stop reason: SDUPTHER

## 2023-03-22 NOTE — TELEPHONE ENCOUNTER
Patient called stated she spoke with her insurance company and they stated since she is diabetic she can get a prescription for a glucometer, test strips and lancets. Also would she need any glucose tablets or anything like that? She uses [facility].

## 2023-03-23 ENCOUNTER — TELEPHONE (OUTPATIENT)
Dept: OBGYN CLINIC | Age: 43
End: 2023-03-23

## 2023-03-23 LAB
25(OH)D3+25(OH)D2 SERPL-MCNC: 7.9 NG/ML (ref 30–100)
ALBUMIN/CREAT UR: 108 MG/G CREAT (ref 0–29)
CHOLEST SERPL-MCNC: 188 MG/DL (ref 100–199)
CREAT UR-MCNC: 170.1 MG/DL
HCV IGG SERPL QL IA: NON REACTIVE
HDLC SERPL-MCNC: 40 MG/DL
LDLC SERPL CALC-MCNC: 114 MG/DL (ref 0–99)
MICROALBUMIN UR-MCNC: 183.6 UG/ML
TRIGL SERPL-MCNC: 196 MG/DL (ref 0–149)
TSH SERPL DL<=0.005 MIU/L-ACNC: 1.39 UIU/ML (ref 0.45–4.5)
VLDLC SERPL CALC-MCNC: 34 MG/DL (ref 5–40)

## 2023-03-23 RX ORDER — ERGOCALCIFEROL 1.25 MG/1
50000 CAPSULE ORAL
Qty: 12 CAPSULE | Refills: 0 | Status: SHIPPED | OUTPATIENT
Start: 2023-03-23 | End: 2023-03-24 | Stop reason: SDUPTHER

## 2023-03-23 NOTE — TELEPHONE ENCOUNTER
3/23/2023 @3:14pm-Pt called and left message on VM regarding an appt. At 3:37pm, returned phone call to patient regarding an appt. Pt only wanted to see a female provider. I was unable to find an annual exam appt for the patient. Patient stated she will call back. Pt is aware our office has 3 male providers as well.

## 2023-03-24 ENCOUNTER — TELEPHONE (OUTPATIENT)
Dept: INTERNAL MEDICINE CLINIC | Age: 43
End: 2023-03-24

## 2023-03-24 RX ORDER — METFORMIN HYDROCHLORIDE 500 MG/1
500 TABLET ORAL 2 TIMES DAILY WITH MEALS
Qty: 60 TABLET | Refills: 3 | Status: SHIPPED | OUTPATIENT
Start: 2023-03-24 | End: 2023-04-23

## 2023-03-24 RX ORDER — ISOPROPYL ALCOHOL 70 ML/100ML
SWAB TOPICAL
Qty: 100 PAD | Refills: 3 | Status: SHIPPED | OUTPATIENT
Start: 2023-03-24

## 2023-03-24 RX ORDER — ERGOCALCIFEROL 1.25 MG/1
50000 CAPSULE ORAL
Qty: 12 CAPSULE | Refills: 0 | Status: SHIPPED | OUTPATIENT
Start: 2023-03-24

## 2023-03-24 RX ORDER — IBUPROFEN 200 MG
CAPSULE ORAL
Qty: 100 STRIP | Refills: 3 | Status: SHIPPED | OUTPATIENT
Start: 2023-03-24

## 2023-03-24 RX ORDER — INSULIN PUMP SYRINGE, 3 ML
EACH MISCELLANEOUS
Qty: 1 KIT | Refills: 1 | Status: SHIPPED | OUTPATIENT
Start: 2023-03-24

## 2023-03-24 NOTE — TELEPHONE ENCOUNTER
Patient called and stated that all her medications were sent to [facility] and it needs to be sent to [facility]. Can you please resend them.

## 2023-03-24 NOTE — TELEPHONE ENCOUNTER
Pt made aware that glucose meter is sent to the pharmacy also instructed on what to do if her BS is dropping.

## 2023-03-24 NOTE — PROGRESS NOTES
Please inform Ms.  Per day her vitamin D is very low I will send vitamin D once a week for 3 months    Her triglyceride is up and bad cholesterol slightly up she has to stop eating fast food as I mentioned her triglyceride should be below 150    If needed I will start her on low-dose cholesterol medicine after controlling her blood sugar    Her urine shows slight microalbumin means protein in the urine because of uncontrolled diabetes    She is to stop sugar and starch in the diet    Screening for hepatitis C negative    Thyroid function normal

## 2023-03-30 ENCOUNTER — TELEPHONE (OUTPATIENT)
Dept: SURGERY | Age: 43
End: 2023-03-30

## 2023-03-30 NOTE — TELEPHONE ENCOUNTER
I spoke with patient regarding bariatric benefits. She is covered at 100%. I scheduled her for a consult with Dr. Luz Maria Li.

## 2023-04-03 ENCOUNTER — OFFICE VISIT (OUTPATIENT)
Dept: SURGERY | Age: 43
End: 2023-04-03
Payer: COMMERCIAL

## 2023-04-03 DIAGNOSIS — G47.33 OBSTRUCTIVE SLEEP APNEA SYNDROME: ICD-10-CM

## 2023-04-03 DIAGNOSIS — E11.65 UNCONTROLLED TYPE 2 DIABETES MELLITUS WITH HYPERGLYCEMIA (HCC): ICD-10-CM

## 2023-04-03 PROCEDURE — 99204 OFFICE O/P NEW MOD 45 MIN: CPT | Performed by: SURGERY

## 2023-04-03 PROCEDURE — 3052F HG A1C>EQUAL 8.0%<EQUAL 9.0%: CPT | Performed by: SURGERY

## 2023-04-03 NOTE — LETTER
4/3/2023    Patient: Jasper Nassar   YOB: 1980   Date of Visit: 4/3/2023     Mariya Madera MD  [facility],[address], [facility]  [address] [place]    Dear Mariya Madera MD,      Thank you for referring Ms. Jayme Jerry to [address] [place] [address] for evaluation. My notes for this consultation are attached. If you have questions, please do not hesitate to call me. I look forward to following your patient along with you.       Sincerely,    Jeane Mahajan, DO

## 2023-04-03 NOTE — PATIENT INSTRUCTIONS
[address] [place] [address]  Timeline Overview of Bariatric Surgery Program    - Initial consult visit with surgeon  - Begin dietician consult/monthly supervised weight loss visits (typically for a minimum of 6 months)  - Complete all other requirements outlined in your letter while doing the monthly visits. - After your last monthly visit, Catherine Blakely, Patient Care Coordinator, will confirm that all requirements have been completed and then contact you to set up a check-in visit with the surgeon. - Check-in visit with surgeon: if everything completed, we submit all paperwork to your insurance company for approval.  - Insurance approval can take up to 4 weeks, so surgeries are usually scheduled approximately 6-8 weeks after your check-in visit with the surgeon. - 4 weeks before surgery: pre-admission testing with lab work  - 3 weeks before surgery: final preoperative visit with surgeon  - 2-3 weeks before surgery: preoperative class  - Surgery! If you have any questions about scheduling or paperwork, please contact:  Catherine Blakely  Patient Care Coordinator  [phone]  [email or web address]         Learning About Weight-Loss (Bariatric) Surgery  What is weight-loss surgery? Bariatric surgery is surgery to help you lose weight. This type of surgery is only used for people who are very overweight and have not been able to lose weight with diet and exercise. This surgery makes the stomach smaller. Some types of surgery also change the connection between your stomach and intestines. Having weight-loss surgery is a big step. After surgery, you'll need to make new, lifelong changes in how you eat and drink. How is weight-loss surgery done? Bariatric surgery may be either \"open\" or \"laparoscopic. \" Open surgery is done through a large cut (incision) in the belly. Laparoscopic surgery is done through several small cuts.  The doctor puts a lighted tube, or scope, and other surgical tools through small cuts in your belly. The doctor is able to see your organs with the scope. There are different types of bariatric surgery. Gastric sleeve surgery  The surgery is usually done through several small incisions in the belly. The doctor removes more than half of your stomach. This leaves a thin sleeve, or tube, that is about the size of a banana. Because part of your stomach has been removed, this can't be reversed. Demetrio-en-Y gastric bypass surgery  Demetrio-en-Y (say \"janine-en-why\") surgery changes the connection between the stomach and the intestines. The doctor separates a section of your stomach from the rest of your stomach. This makes a small pouch. The new pouch will hold the food you eat. The doctor connects the stomach pouch to the middle part of the small intestine. Gastric banding surgery  The surgery is usually done through several small incisions in the belly. The doctor wraps a band around the upper part of the stomach. This creates a small pouch. The small size of the pouch means that you will get full after you eat just a small amount of food. The doctor can inflate or deflate the band to adjust the size. This lets the doctor adjust how quickly food passes from the new pouch into the stomach. It does not change the connection between the stomach and the intestines. What can you expect after the surgery? You may stay in the hospital for one or more days after the surgery. How long you stay depends on the type of surgery you had. Most people need 2 to 4 weeks before they are ready to get back to their usual routine. Your doctor will give you specific instructions about what to eat after the surgery. You'll start with only small amounts of soft foods and liquids. Bit by bit, you will be able to eat more solid foods. Your doctor may advise you to work with a dietitian. This way you'll be sure to get enough protein, vitamins, and minerals while you are losing weight.  Even with a healthy diet, you may need to take vitamin and mineral supplements. After surgery, you will not be able to eat very much at one time. You will get full quickly. Try not to eat too much at one time or eat foods that are high in fat or sugar. If you do, you may vomit, get stomach pain, or have diarrhea. Weight loss  You probably will lose weight very quickly in the first few months after surgery. As time goes on, your weight loss will slow down. You will have regular doctor visits to check how you are doing. Emotions  It is common to have many emotions after this surgery. You may feel happy or excited as you begin to lose weight. But you may also feel overwhelmed or frustrated by the changes that you have to make in your diet, activity, and lifestyle. Talk with your doctor if you have concerns or questions. Think of bariatric surgery as a tool to help you lose weight. It isn't an instant fix. You will still need to eat a healthy diet and get regular exercise. This will help you reach your weight goal and avoid regaining the weight you lose. Follow-up care is a key part of your treatment and safety. Be sure to make and go to all appointments, and call your doctor if you are having problems. It's also a good idea to know your test results and keep a list of the medicines you take. Where can you learn more? Go to http://www.gray.com/  Enter G469 in the search box to learn more about \"Learning About Weight-Loss (Bariatric) Surgery. \"  Current as of: August 25, 2022               Content Version: 13.6  © 2006-2023 Healthwise, Incorporated. Care instructions adapted under license by [employer] (which disclaims liability or warranty for this information). If you have questions about a medical condition or this instruction, always ask your healthcare professional. [address] any warranty or liability for your use of this information.

## 2023-04-03 NOTE — PROGRESS NOTES
Carlos Mahajan  [address] [facility], [address] [place], [facility]     Bariatric Surgery Consultation    CC: morbid obesity    Subjective: The patient is a 38 y.o. obese  female with a Body mass index is 42.57 kg/m². They have been considering surgery for some time now. The patient presents to the clinic today to discuss surgical weight loss options. They have made multiple attempts at weight loss over the years without success, including weight loss medications, Atkins, going to the gym, slim fast, mediterranean diet. Unfortunately, none of these have lead to meaningful, sustained weight loss. Relevant previous surgical history includes: none. They have attended the bariatric seminar prior to this office visit. The patient's goals for the surgery include be able to ride rollercoasters, be able to keep up with her kids. Tobacco use: never  GERD/hiatal hernia: no    Was previously in the program with Dr. Jair Calero but stopped once COVID hit. Sleep Apnea Assessment:     Has sleep apnea and wears CPAP currently     Comorbidities:     Bariatric comorbidities present: non-insulin dependent diabetes and obstructive sleep apnea    Ambulatory status: independent    The patient's reported level of exercise: not active. Past Medical History:   Diagnosis Date    Diabetes ([address]     Sleep apnea      No past surgical history on file. Social History     Tobacco Use    Smoking status: Never    Smokeless tobacco: Never   Substance Use Topics    Alcohol use: Never      Family History   Problem Relation Age of Onset    Cancer Mother     Cancer Sister     Cancer Maternal Grandfather       Prior to Admission medications    Medication Sig Start Date End Date Taking? Authorizing Provider   alcohol swabs (BD Single Use Swabs Regular) padm Test blood sugar twice a day.  E11.65 3/24/23  Yes Juan Ramon Sam MD   Blood-Glucose Meter monitoring kit Test blood sugar twice a day.. E11.65 3/24/23  Yes Tiffanie Arredondo MD   ergocalciferol (ERGOCALCIFEROL) 1,250 mcg (50,000 unit) capsule Take 1 Capsule by mouth every seven (7) days. 3/24/23  Yes Tiffanie Arredondo MD   glucose blood VI test strips (blood glucose test) strip Test blood sugar twice a day. . E11.65 3/24/23  Yes Tiffanie Arredondo MD   metFORMIN (GLUCOPHAGE) 500 mg tablet Take 1 Tablet by mouth two (2) times daily (with meals) for 30 days. 3/24/23 4/23/23 Yes Tiffanie Arredondo MD   lancets misc Test blood sugar twice a day. . E11.65 3/22/23  Yes Tiffanie Arredondo MD     Allergies   Allergen Reactions    Amoxicillin Nausea and Vomiting    Penicillins Nausea and Vomiting        Review of Systems:    Constitutional: No fever or chills  Neurologic: No headache  Eyes: No scleral icterus or irritated eyes  Nose: No nasal pain or drainage  Mouth: No oral lesions or sore throat  Cardiac: No palpations or chest pain  Pulmonary: No cough or shortness of breath  Gastrointestinal: No nausea, emesis, diarrhea, or constipation  Genitourinary: No dysuria  Musculoskeletal: No muscle or joint tenderness  Skin: No rashes or lesions  Psychiatric: No anxiety or depressed mood      Objective:     Physical Exam:    Visit Vitals  /89 (BP 1 Location: Left upper arm, BP Patient Position: Sitting)   Pulse (!) 103   Temp 97.7 °F (36.5 °C) (Temporal)   Resp 17   Ht 5' 4\" (1.626 m)   Wt 248 lb (112.5 kg)   SpO2 97%   BMI 42.57 kg/m²       General: No acute distress, conversant  Eyes: PERRLA, no scleral icterus  HENT: Normocephalic without oral lesions  Neck: Trachea midline without LAD  Cardiac: Normal pulse rate and rhythm  Pulmonary: Symmetric chest rise with normal effort  GI: Soft, NT, ND, no hernia, no splenomegaly  Skin: Warm without rash  Extremities: No edema or joint stiffness  Psych: Appropriate mood and affect    Assessment:     Morbid obesity with associated comorbidities  Problem List Items Addressed This Visit          Endocrine Uncontrolled type 2 diabetes mellitus with hyperglycemia (HCC)       Respiratory    Sleep apnea       Other    Body mass index 40.0-44.9, adult ([facility] [address] - Primary    Relevant Orders    REFERRAL TO BEHAVIORAL HEALTH       Plan:   The patient presents today with multiple complications relating to their obesity as detailed above. The patient has made multiple unsuccessful attempts at weight loss as detailed above. The patient desires surgical weight loss for a better chance of lifelong weight control and control of co-morbidities. They have attended the bariatric seminar and meet the qualifications for surgery based on the NIH criteria. We have discussed the various surgical options including the sleeve gastrectomy and gastric bypass. We also discussed non operative alternatives. The patient is currently interested in the robotic-assisted gastric bypass. I believe they are a good candidate for this procedure. They will need to a/an upper endoscopy to evaluate their anatomy pre operatively. The patient will be required to not gain weight during this period if starting BMI is 35-45, lose 5% of starting weight if BMI is >45, or lose 10% of starting weight if BMI >60 during the supervised weight loss / pre operative process before our next visit for pre-operative consents. The goal for this patient is to lose 0 lbs.     We recommend that the patient undergo the following evaluations prior to considering surgery:    Dietician: Yes  Psychiatry/Psychology: Yes  Sleep Medicine: no  Cardiology: no  Gastroenterology: no  Pulmonology: no    We have discussed the possible complications of bariatric surgery which include but are not limited to failed weight loss, weight regain, malnutrition, leak, bleed, stricture, gastric ulcer, gastric fistula, gastric bleed, gallstones, new or worsening gastric reflux, nausea, emesis, internal hernia, abdominal wall hernia, gastric perforation, need for revision / conversion / or reversal, pregnancy complications and loss, intestinal ischemia, post operative skin complications, possible thinning of their hair, bowel obstruction, dumping syndrome, wound infection, blood clots (DVT, mesenteric thrombus, pulmonary embolism), increased addictive tendency, risk of anesthesia, and death. The patient understands this is a life altering decision and will require compliance to the program for the remainder of their life in order to be monitored to avoid complication and ensure successful, sustained weight control. They will be placed on a lifelong low carbohydrate and low sugar diet, exercise, and vitamin regimen and will require frequent blood draws and office visits to ensure adequate nutrition and program compliance. Visits and follow up will be in compliance with the guidelines set forth by [facility]. I have specifically mentioned the need to avoid all personal and second-hand tobacco exposure, systemic steroids, and NSAIDS after any bariatric surgery to help avoid the above listed complications. The patient has expressed understanding of the above and would like to enroll in the program. The patient will be submitted for medical and psychological clearance along with establishing with our dietician and joining the pre / post operative support group. They will be screened for depression and sleep apnea and treated pre operatively if needed. The patient will have to demonstrate cessation of tobacco if relevant for at least 3 months preoperatively along with having a controlled HbA1c less than 8. After successful completion of the preoperative regimen the patient will be submitted for insurance approval and pending this will be scheduled for surgery. All questions from the patient have been answered and they have demonstrated appropriate understanding of the process. Total time involved with this patient's care was: 45 minutes.  This involved reviewing patient record, talking with patient, and charting on patient.     Signed By: Juanita Mahajan DO  Bariatric and General Surgeon  [facility]    April 3, 2023

## 2023-04-03 NOTE — PROGRESS NOTES
Identified pt with two pt identifiers (name and ). Reviewed chart in preparation for visit and have obtained necessary documentation. Flynn Kawasaki is a 38 y.o. female  Chief Complaint   Patient presents with    New Patient     New bariatric patient consult. Visit Vitals  /89 (BP 1 Location: Left upper arm, BP Patient Position: Sitting)   Pulse (!) 103   Temp 97.7 °F (36.5 °C) (Temporal)   Resp 17   Ht 5' 4\" (1.626 m)   Wt 248 lb (112.5 kg)   SpO2 97%   BMI 42.57 kg/m²       1. Have you been to the ER, urgent care clinic since your last visit? Hospitalized since your last visit? No    2. Have you seen or consulted any other health care providers outside of the [address] [place] [address] since your last visit? Include any pap smears or colon screening.  No

## 2023-04-03 NOTE — LETTER
4/5/2023     Ms. Macho Gandhi [id number]    Welcome to [facility] for weight loss surgery. Enclosed you will find a letter with your insurance requirements and any additional testing the provider is requesting specific to your case. Please read the letter in its entirety as it will provide the tools you will need to be successful with our program. Your letter serves as a check off list for your insurance requirements so keep it in a safe place. Nutrition/Behavioral Health Policy    Our nutrition and Behavioral Health program has a strict 3 time no show/reschedule/cancellation policy with our dieticians and Licensed Practical Counselor. Once you have exhausted your 3-time limit, you will not be allowed to schedule with the dietician/Licensed Practical Counselor for the remainder of your required supervised weight loss counseling and/or behavioral health visits. At that point you will need to schedule with your primary care physician to complete the visits or can be referred to another provider for your psychological evaluation. We offer the nutrition portion of the program as a free service to our patients. You will not be charged, nor will your insurance be billed for the required monthly visits with our dietician. Patient Responsibility    It is the responsibility of the patient to schedule all appointments that are required (psychological evaluation, testing, dietician visits, etc). The attached letter will give you the providers name and phone numbers to schedule. Schedule your appointments concurrently with the nutrition visits, the goal is to have the other requirements done before you have completed your last nutrition visit. Verify with our office that we have received the results/documentation of any testing or clearance that is required as you get closer to completion of your required nutrition visits.      A1C must be 8% or less for surgery scheduling    It is recommended that all patients have a scale at home for use to monitor weight and/or weight loss. Must be smoking/nicotine free 3 months prior to surgery (cigarettes, vaping, marijuana, edibles, etc)      Thank you for entrusting us with your care and we look forward to helping on your weight loss journey to be a healthier you.          ** COMPLETE EVERYTHING WITHIN A YEAR TO MOVE FORWARD **    Before we can process your information for Robotic-Assisted Gastric Bypass surgery with Dr. Cecilia Jara, [address]  will require the following:     Endoscopy: Call   [facility] at [phone] and speak with Mackenzie Gillialnd CMA to schedule. Psychological Evaluation--  Yahaira Martines, [address] [phone], Naty Ocampo, Ph.D. [place], [phone], No [facility] or [facility]), Maryam Nguyen [phone], Jenny Richards, Rezay.GEORGE at [phone] ([address], [place], [place], [place], and [place] only), [place], [place]. D , or text your name, surgeon, and picture of insurance card to [facility] at [phone], you will then receive a link to schedule your appointment. Nutrition Evaluation with our Bariatric Program - You will complete this appointment with one of our office dietitians. Please call [phone] to schedule this appointment. Complete 6 consecutive months of supervised weight loss counseling (one per month -180 day span) This can be completed with our office dietitians free of charge. These sessions will be scheduled AFTER you complete the nutrition evaluation listed above. Letter of support/medical necessity from your family MD -- (form in the mail). Office visit with Dr. Cecilia Jara after completing all insurance requirements.     **Schedule your appointments concurrently with the nutrition visits, the goal is to have everything done before you have completed the nutrition visits**    Please fax all documentation to me (vane Huerta) at [phone]. Once all information has been received, you will meet with your surgeon for a final review. After your surgeon clears you for surgery, we will submit your case to insurance for approval and await their decision. If you have any questions regarding this information, please call me at [phone].       Sincerely,      Catherine Blakely  Patient Care Coordinator  [phone]  [email or web address]

## 2023-04-17 RX ORDER — METFORMIN HYDROCHLORIDE 500 MG/1
500 TABLET ORAL 2 TIMES DAILY WITH MEALS
Qty: 60 TABLET | Refills: 3 | Status: SHIPPED | OUTPATIENT
Start: 2023-04-17 | End: 2023-05-17

## 2023-04-17 RX ORDER — ERGOCALCIFEROL 1.25 MG/1
50000 CAPSULE ORAL
Qty: 12 CAPSULE | Refills: 0 | Status: SHIPPED | OUTPATIENT
Start: 2023-04-17

## 2023-04-20 PROBLEM — Z12.4 ENCOUNTER FOR SCREENING FOR CERVICAL CANCER: Status: RESOLVED | Noted: 2021-01-21 | Resolved: 2023-04-20

## 2023-05-09 ENCOUNTER — OFFICE VISIT (OUTPATIENT)
Age: 43
End: 2023-05-09

## 2023-05-09 DIAGNOSIS — E66.01 MORBID OBESITY (HCC): Primary | ICD-10-CM

## 2023-05-09 NOTE — PROGRESS NOTES
[facility]  Supervised Weight Loss     Date:   2023    Patient's Name: Adolfo Slater  : 1980    Insurance:  [employer]            Session: 2 of  6  Surgery: Gastric Bypass  Surgeon:  Linda Martinez D.O. Height: 64\"  Reported Weight:    248      Lbs. BMI: 42   Pounds Lost since last month: 0               Pounds Gained since last month: 0    Starting Weight: 248#   Previous Months Weight: 248#  Overall Pounds Lost: 0  Overall Pounds Gained: 0    Other Pertinent Information: Today's appointment was completed over the phone. Today's wt was self-reported. Pt is scheduled to attend 4 nutrition education classes for diabetes management. Smoking Status:  none  Alcohol Intake: none    I have reviewed with pt the guidelines of the supervised wt loss program.  Pt understands the expectations of some wt loss during the program and that wt gain could delay the process. I have also explained that appointments need to be consecutive and missing an appointment may result in starting over. Pt has received this information in writing. Changes that patient has made since last month include:  Healthier snack choices (high protein options). Food journal to monitor eating patterns/schedule. Eating Habits and Behaviors  A nutrition lesson was presented on label reading with specific guidelines provided for limiting added sugars. This information will help increase healthy food choices, promote weight loss and prevent dumping syndrome after gastric bypass. We also reviewed the general nutrition guidelines for bariatric surgery. Patient's current diet habits include: Pt is eating 2-3 meals per day. Tends to skip meals. Snack choices include trail mix (instead of Doritos) and dried fruit. Pt is eating refined carbohydrate foods (bread, pasta, rice, potatoes) in moderation. Pt is eating sweets/desserts in moderation.  Pt is using healthy

## 2023-05-25 ENCOUNTER — TELEPHONE (OUTPATIENT)
Facility: CLINIC | Age: 43
End: 2023-05-25

## 2023-05-25 ENCOUNTER — HOSPITAL ENCOUNTER (EMERGENCY)
Facility: HOSPITAL | Age: 43
Discharge: HOME | End: 2023-05-25
Attending: EMERGENCY MEDICINE
Payer: COMMERCIAL

## 2023-05-25 VITALS
BODY MASS INDEX: 41.83 KG/M2 | HEART RATE: 95 BPM | TEMPERATURE: 98.6 F | WEIGHT: 245 LBS | SYSTOLIC BLOOD PRESSURE: 143 MMHG | OXYGEN SATURATION: 100 % | HEIGHT: 64 IN | DIASTOLIC BLOOD PRESSURE: 92 MMHG | RESPIRATION RATE: 14 BRPM

## 2023-05-25 DIAGNOSIS — K62.5 RECTAL BLEEDING: Primary | ICD-10-CM

## 2023-05-25 DIAGNOSIS — K59.1 FUNCTIONAL DIARRHEA: ICD-10-CM

## 2023-05-25 DIAGNOSIS — K64.4 EXTERNAL HEMORRHOID, BLEEDING: ICD-10-CM

## 2023-05-25 PROCEDURE — 99282 EMERGENCY DEPT VISIT SF MDM: CPT

## 2023-05-25 ASSESSMENT — PAIN - FUNCTIONAL ASSESSMENT: PAIN_FUNCTIONAL_ASSESSMENT: NONE - DENIES PAIN

## 2023-05-25 NOTE — ED TRIAGE NOTES
Pt complains of blood in her stool when she wiped. States she had a large amount of bowel movements today. Denies abd pain, vomiting or diarrhea.

## 2023-05-25 NOTE — ED PROVIDER NOTES
appointment as soon as possible for a visit           DISCHARGE MEDICATIONS:     Medication List      You have not been prescribed any medications. DISCONTINUED MEDICATIONS:  There are no discharge medications for this patient. I am the Primary Clinician of Record: Ángel Monk MD (electronically signed)    (Please note that parts of this dictation were completed with voice recognition software. Quite often unanticipated grammatical, syntax, homophones, and other interpretive errors are inadvertently transcribed by the computer software. Please disregards these errors.  Please excuse any errors that have escaped final proofreading.)     Ángel Monk MD  05/25/23 [id number]

## 2023-05-25 NOTE — DISCHARGE INSTRUCTIONS
You were seen for rectal bleeding with diarrhea due to metformin side effect. This is likely irritating your hemorrhoids causing some bleeding. No signs of any intra-abdominal infection. Please start placing Preparation H to that area.

## 2023-05-29 RX ORDER — LANCETS 30 GAUGE
EACH MISCELLANEOUS
COMMUNITY
Start: 2023-03-22

## 2023-05-29 RX ORDER — ERGOCALCIFEROL 1.25 MG/1
50000 CAPSULE ORAL
COMMUNITY
Start: 2023-04-17

## 2023-06-06 ENCOUNTER — OFFICE VISIT (OUTPATIENT)
Age: 43
End: 2023-06-06

## 2023-06-06 DIAGNOSIS — E66.01 MORBID OBESITY (HCC): Primary | ICD-10-CM

## 2023-06-19 PROBLEM — Z12.4 SCREENING FOR CERVICAL CANCER: Status: ACTIVE | Noted: 2022-07-23

## 2023-06-19 PROBLEM — R73.09 ABNORMAL GLUCOSE: Status: RESOLVED | Noted: 2022-07-23 | Resolved: 2023-06-19

## 2023-06-29 ENCOUNTER — TELEPHONE (OUTPATIENT)
Age: 43
End: 2023-06-29

## 2023-06-29 ENCOUNTER — TELEMEDICINE (OUTPATIENT)
Age: 43
End: 2023-06-29

## 2023-06-29 DIAGNOSIS — F43.22 ADJUSTMENT DISORDER WITH ANXIOUS MOOD: Primary | ICD-10-CM

## 2023-06-29 DIAGNOSIS — E66.01 MORBID OBESITY (HCC): ICD-10-CM

## 2023-07-05 ENCOUNTER — OFFICE VISIT (OUTPATIENT)
Age: 43
End: 2023-07-05

## 2023-07-05 DIAGNOSIS — E66.01 MORBID OBESITY (HCC): Primary | ICD-10-CM

## 2023-07-10 ENCOUNTER — HOSPITAL ENCOUNTER (EMERGENCY)
Facility: HOSPITAL | Age: 43
Discharge: HOME | End: 2023-07-10
Attending: STUDENT IN AN ORGANIZED HEALTH CARE EDUCATION/TRAINING PROGRAM
Payer: COMMERCIAL

## 2023-07-10 VITALS
HEART RATE: 77 BPM | HEIGHT: 64 IN | DIASTOLIC BLOOD PRESSURE: 88 MMHG | RESPIRATION RATE: 18 BRPM | OXYGEN SATURATION: 98 % | BODY MASS INDEX: 42.34 KG/M2 | TEMPERATURE: 98.2 F | WEIGHT: 248 LBS | SYSTOLIC BLOOD PRESSURE: 127 MMHG

## 2023-07-10 DIAGNOSIS — K04.7 PERIAPICAL ABSCESS: Primary | ICD-10-CM

## 2023-07-10 PROCEDURE — 6370000000 HC RX 637 (ALT 250 FOR IP): Performed by: STUDENT IN AN ORGANIZED HEALTH CARE EDUCATION/TRAINING PROGRAM

## 2023-07-10 PROCEDURE — 6360000002 HC RX W HCPCS: Performed by: STUDENT IN AN ORGANIZED HEALTH CARE EDUCATION/TRAINING PROGRAM

## 2023-07-10 PROCEDURE — 99283 EMERGENCY DEPT VISIT LOW MDM: CPT

## 2023-07-10 RX ORDER — ONDANSETRON 4 MG/1
4 TABLET, ORALLY DISINTEGRATING ORAL ONCE
Status: COMPLETED | OUTPATIENT
Start: 2023-07-10 | End: 2023-07-10

## 2023-07-10 RX ORDER — ONDANSETRON 4 MG/1
4 TABLET, FILM COATED ORAL EVERY 8 HOURS PRN
Qty: 20 TABLET | Refills: 0 | Status: SHIPPED | OUTPATIENT
Start: 2023-07-10

## 2023-07-10 RX ORDER — FAMOTIDINE 20 MG/1
20 TABLET, FILM COATED ORAL ONCE
Status: COMPLETED | OUTPATIENT
Start: 2023-07-10 | End: 2023-07-10

## 2023-07-10 RX ORDER — FAMOTIDINE 20 MG/1
20 TABLET, FILM COATED ORAL 2 TIMES DAILY
Qty: 14 TABLET | Refills: 0 | Status: SHIPPED | OUTPATIENT
Start: 2023-07-10 | End: 2023-07-17

## 2023-07-10 RX ORDER — AMOXICILLIN AND CLAVULANATE POTASSIUM 875; 125 MG/1; MG/1
1 TABLET, FILM COATED ORAL
Status: COMPLETED | OUTPATIENT
Start: 2023-07-10 | End: 2023-07-10

## 2023-07-10 RX ORDER — IBUPROFEN 600 MG/1
600 TABLET ORAL EVERY 8 HOURS PRN
Qty: 20 TABLET | Refills: 0 | Status: SHIPPED | OUTPATIENT
Start: 2023-07-10

## 2023-07-10 RX ORDER — DEXAMETHASONE SODIUM PHOSPHATE 10 MG/ML
6 INJECTION, SOLUTION INTRAMUSCULAR; INTRAVENOUS ONCE
Status: COMPLETED | OUTPATIENT
Start: 2023-07-10 | End: 2023-07-10

## 2023-07-10 RX ORDER — IBUPROFEN 600 MG/1
600 TABLET ORAL
Status: COMPLETED | OUTPATIENT
Start: 2023-07-10 | End: 2023-07-10

## 2023-07-10 RX ORDER — AMOXICILLIN AND CLAVULANATE POTASSIUM 875; 125 MG/1; MG/1
1 TABLET, FILM COATED ORAL 2 TIMES DAILY
Qty: 20 TABLET | Refills: 0 | Status: SHIPPED | OUTPATIENT
Start: 2023-07-10 | End: 2023-07-20

## 2023-07-10 RX ADMIN — IBUPROFEN 600 MG: 600 TABLET, FILM COATED ORAL at 07:07

## 2023-07-10 RX ADMIN — FAMOTIDINE 20 MG: 20 TABLET, FILM COATED ORAL at 07:08

## 2023-07-10 RX ADMIN — DEXAMETHASONE SODIUM PHOSPHATE 6 MG: 10 INJECTION, SOLUTION INTRAMUSCULAR; INTRAVENOUS at 07:08

## 2023-07-10 RX ADMIN — ONDANSETRON 4 MG: 4 TABLET, ORALLY DISINTEGRATING ORAL at 07:07

## 2023-07-10 RX ADMIN — AMOXICILLIN AND CLAVULANATE POTASSIUM 1 TABLET: 875; 125 TABLET, FILM COATED ORAL at 07:07

## 2023-07-10 ASSESSMENT — LIFESTYLE VARIABLES
HOW OFTEN DO YOU HAVE A DRINK CONTAINING ALCOHOL: NEVER
HOW MANY STANDARD DRINKS CONTAINING ALCOHOL DO YOU HAVE ON A TYPICAL DAY: PATIENT DOES NOT DRINK

## 2023-07-10 ASSESSMENT — PAIN - FUNCTIONAL ASSESSMENT: PAIN_FUNCTIONAL_ASSESSMENT: 0-10

## 2023-07-10 ASSESSMENT — PAIN SCALES - GENERAL: PAINLEVEL_OUTOF10: 9

## 2023-07-10 NOTE — ED TRIAGE NOTES
Pt c/o toothache that started last Thursday and today pt woke up with right facial swelling.  Pt states her dentist cannot see her until August.

## 2023-07-10 NOTE — ED PROVIDER NOTES
[address]  EMERGENCY DEPARTMENT ENCOUNTER NOTE    Date: 7/10/2023  Patient Name: Umm Alamo    History of Presenting Illness     Chief Complaint   Patient presents with    Dental Pain       History obtained from: Patient    HPI: Umm Alamo, 38 y.o. female with past medical history as listed and reviewed below presents for dental pain. Patient reports a 3 day history of dental pain. It is localized to the R 2nd molar. Pain is described as aching and improving with ibuprofen but had some facial swelling that stareted 2 days ago without erythema. Patient denies sublingual fullness, shortness of breath, stridor, fevers, chills, vomiting, neck stiffness, and inability to tolerate PO intake. Medications taken prior to presentation: ibuprofen 12 hours ago/ Dental apt scheduled. Medical History   I reviewed the medical, surgical, family, and social history, as well as allergies:    PCP: Sophia Fink MD    Past Medical History:  Past Medical History:   Diagnosis Date    Diabetes ([address])     Sleep apnea      Past Surgical History:  History reviewed. No pertinent surgical history. Current Outpatient Medications:  Current Outpatient Medications   Medication Instructions    amoxicillin-clavulanate (AUGMENTIN) 875-125 MG per tablet 1 tablet, Oral, 2 TIMES DAILY    ergocalciferol (ERGOCALCIFEROL) 50,000 Units, Oral, EVERY 7 DAYS    famotidine (PEPCID) 20 mg, Oral, 2 TIMES DAILY    ibuprofen (ADVIL;MOTRIN) 600 mg, Oral, EVERY 8 HOURS PRN    Lancets MISC Test blood sugar twice a day. . E11.65    ondansetron (ZOFRAN) 4 mg, Oral, EVERY 8 HOURS PRN    Tylenol 650 mg, Oral, EVERY 6 HOURS PRN      Family History:  Family History   Problem Relation Age of Onset    Cancer Maternal Grandfather     Cancer Sister     Cancer Mother      Social History:  Social History     Tobacco Use    Smoking status: Never    Smokeless tobacco: Never   Substance Use Topics    Alcohol use: Never

## 2023-07-10 NOTE — DISCHARGE INSTRUCTIONS
Dental resources:    Emergency [address] [facility] [address]   [address], [place], [address] [facility]   Monday, Wednesday, Friday: 8am-5pm   Tuesday and Thursday: 8am-6pm   Phone: [phone]   $70 for Emergency Care   $60 for first routine care, then pay by sliding scale based upon income     [facility]   [address] [place]   Phone: [phone], select option (2) to confirm time for treatment     [facility]   [address], [address] [facility] [address] [place]   Monday-Friday: 8am-4pm   Phone: [phone] of Dentistry Urgent [address] [facility], [address] [place] [address], [address] [place] [address]   Phone: [phone] to confirm a time for emergency treatment   Pediatrics: [phone]   $75 per tooth, extractions only     [facility]   [address], [address] [address]   Phone [phone] or [phone]   Hours [id number] (extractions)   Simple tooth extraction: $60 per tooth, $55 per x-ray     [facility] (in [place])   [facility] AT [place] only   Phone: [phone], leave message saying you need an appointment to register   Hours: Wed 6-9p       Non-Urgent [facility]RELLA [address] [facility] [address] [facility]  [address], [place], [address] [facility]   Dental Clinic: [phone]   Oral Surgery Clinic: [phone]

## 2023-07-13 ENCOUNTER — TELEPHONE (OUTPATIENT)
Age: 43
End: 2023-07-13

## 2023-07-13 NOTE — TELEPHONE ENCOUNTER
Spoke to patient to schedule Her EGD. I offered  9/20/23 @ 10:30AM with arrival time of 9:00AM.  she acknowledged: that she was surprised how far our we were scheduling. I let her know that Dr Martinez only does this procedure once a month. She said ok and said that date would work      I informed her with the procedure she is required to have someone come in with her at registration and stay on the property during the duration of the procedure. Bring photo ID and insurance card. She acknowledged ok    I told her once this is scheduled I will put all this information we discussed in a letter that will post to her my chart and go out in the mail.   She acknowledged ok and thank  you

## 2023-07-19 PROBLEM — Z12.4 SCREENING FOR CERVICAL CANCER: Status: RESOLVED | Noted: 2022-07-23 | Resolved: 2023-07-19

## 2023-07-20 ENCOUNTER — HOSPITAL ENCOUNTER (EMERGENCY)
Facility: HOSPITAL | Age: 43
Discharge: HOME | End: 2023-07-20
Attending: EMERGENCY MEDICINE
Payer: COMMERCIAL

## 2023-07-20 VITALS
OXYGEN SATURATION: 97 % | TEMPERATURE: 98.3 F | DIASTOLIC BLOOD PRESSURE: 81 MMHG | HEART RATE: 98 BPM | SYSTOLIC BLOOD PRESSURE: 131 MMHG | WEIGHT: 240 LBS | HEIGHT: 64 IN | BODY MASS INDEX: 40.97 KG/M2 | RESPIRATION RATE: 19 BRPM

## 2023-07-20 DIAGNOSIS — K04.7 DENTAL ABSCESS: Primary | ICD-10-CM

## 2023-07-20 DIAGNOSIS — K02.9 DENTAL CARIES: ICD-10-CM

## 2023-07-20 PROCEDURE — 99283 EMERGENCY DEPT VISIT LOW MDM: CPT | Performed by: EMERGENCY MEDICINE

## 2023-07-20 RX ORDER — HYDROCODONE BITARTRATE AND ACETAMINOPHEN 5; 325 MG/1; MG/1
1 TABLET ORAL EVERY 6 HOURS PRN
Qty: 10 TABLET | Refills: 0 | Status: SHIPPED | OUTPATIENT
Start: 2023-07-20 | End: 2023-07-23

## 2023-07-20 RX ORDER — CLINDAMYCIN HYDROCHLORIDE 300 MG/1
300 CAPSULE ORAL 3 TIMES DAILY
Qty: 30 CAPSULE | Refills: 0 | Status: SHIPPED | OUTPATIENT
Start: 2023-07-20 | End: 2023-07-30

## 2023-07-20 ASSESSMENT — LIFESTYLE VARIABLES
HOW MANY STANDARD DRINKS CONTAINING ALCOHOL DO YOU HAVE ON A TYPICAL DAY: PATIENT DOES NOT DRINK
HOW OFTEN DO YOU HAVE A DRINK CONTAINING ALCOHOL: NEVER

## 2023-07-20 ASSESSMENT — PAIN - FUNCTIONAL ASSESSMENT: PAIN_FUNCTIONAL_ASSESSMENT: 0-10

## 2023-07-20 ASSESSMENT — PAIN SCALES - GENERAL: PAINLEVEL_OUTOF10: 10

## 2023-07-20 NOTE — ED TRIAGE NOTES
Patient complains of right side dental pain, swelling and headache from split tooth, has appointment on Aug 8th with dentist.

## 2023-07-20 NOTE — ED PROVIDER NOTES
worsen      DISCHARGE MEDICATIONS:  Discharge Medication List as of 7/20/2023  3:57 PM        START taking these medications    Details   clindamycin (CLEOCIN) 300 MG capsule Take 1 capsule by mouth 3 times daily for 10 days, Disp-30 capsule, R-0Normal      HYDROcodone-acetaminophen (NORCO) 5-325 MG per tablet Take 1 tablet by mouth every 6 hours as needed for Pain for up to 3 days. Intended supply: 3 days. Take lowest dose possible to manage pain Max Daily Amount: 4 tablets, Disp-10 tablet, R-0Normal             Hoda Wyatt,     Patient seen in the context of the Novel Coronavirus (COVID19) pandemic, utilizing contemporary protocols and evidence based on the most up to date available evidence, understanding that the current evidence has the potential to change as additional information becomes available. This note is dictated utilizing Dragon voice recognition software. Unfortunately this leads to occasional typographical errors using the voice recognition. I apologize in advance if the situation occurs. If questions occur please do not hesitate to contact me directly.     Hoda Wyatt, [address], DO  07/23/23 7164

## 2023-07-20 NOTE — ED NOTES
Pt understanding of dc instructions medications and followup care      Daniel Cobos, EITAN  07/20/23 3249

## 2023-08-02 ENCOUNTER — OFFICE VISIT (OUTPATIENT)
Age: 43
End: 2023-08-02

## 2023-08-02 DIAGNOSIS — E66.01 MORBID OBESITY (HCC): Primary | ICD-10-CM

## 2023-08-03 ENCOUNTER — TELEPHONE (OUTPATIENT)
Age: 43
End: 2023-08-03

## 2023-08-03 NOTE — TELEPHONE ENCOUNTER
Pt called and LVM wanting to reschedule with Ivania. Called pt back and advised that next available would be Sept 5 for the bariatric eval follow up.  Pt is going to keep appt on august 9

## 2023-08-09 ENCOUNTER — TELEMEDICINE (OUTPATIENT)
Age: 43
End: 2023-08-09

## 2023-08-09 DIAGNOSIS — E66.01 MORBID OBESITY (HCC): ICD-10-CM

## 2023-08-09 DIAGNOSIS — F43.22 ADJUSTMENT DISORDER WITH ANXIOUS MOOD: Primary | ICD-10-CM

## 2023-09-05 ENCOUNTER — OFFICE VISIT (OUTPATIENT)
Age: 43
End: 2023-09-05

## 2023-09-05 DIAGNOSIS — E66.01 MORBID OBESITY (HCC): Primary | ICD-10-CM

## 2023-09-20 ENCOUNTER — TELEPHONE (OUTPATIENT)
Age: 43
End: 2023-09-20

## 2023-09-20 NOTE — TELEPHONE ENCOUNTER
Patient miss endo procedure today. I attempted to call her to reschedule.   I was unable to leave a VM due to VM not set up

## 2023-09-25 ENCOUNTER — TELEPHONE (OUTPATIENT)
Age: 43
End: 2023-09-25

## 2023-09-25 NOTE — TELEPHONE ENCOUNTER
2nd attempt to contact patient to see if she wants to reschedule endo procedure that was no showed on 9/20    I got a message that this call couldn't be completed at this time

## 2023-09-29 ENCOUNTER — TELEPHONE (OUTPATIENT)
Age: 43
End: 2023-09-29

## 2023-09-29 NOTE — TELEPHONE ENCOUNTER
3rd attempt to contact patient to reschedule endo procedure with Dr Martinez that was no showed.   Unable to leave VM due to VM not set up

## 2023-12-29 ENCOUNTER — TELEPHONE (OUTPATIENT)
Age: 43
End: 2023-12-29

## 2023-12-29 NOTE — TELEPHONE ENCOUNTER
Identified patient with two patient identifiers (name and ). Reviewed chart in preparation for encounter and have obtained necessary documentation.     Spoke with patient, advised her of remaining requirements:     PCP Support Form  EGD    Also informed patient that once our surgery scheduler returns to the office she would be able to call her and schedule her for her EGD. Pt stated her availability was anytime before 2pm because she works from home. Pt expressed understanding of all that was discussed.

## 2024-01-03 ENCOUNTER — TELEPHONE (OUTPATIENT)
Age: 44
End: 2024-01-03

## 2024-01-03 ENCOUNTER — PREP FOR PROCEDURE (OUTPATIENT)
Age: 44
End: 2024-01-03

## 2024-01-03 DIAGNOSIS — Z98.84 BARIATRIC SURGERY STATUS: ICD-10-CM

## 2024-01-03 NOTE — TELEPHONE ENCOUNTER
Returning patients call to schedule an endo procedure with Dr Martinez    Spoke to patient to schedule her EGD with Dr Martinez.  I offered 1/17/24 @ 10:00am with arrival time of 8:30am and she accepted.    I let the patient know they are required to bring someone with them that will be responsible to take them home along with their photo ID and insurance card.      I let them know once this is scheduled I will put the information in a letter along with where they need to go and pre-procedure instructions.   This letter will post to their my chart and go out in the mail.  She acknowledged thank you

## 2024-01-16 RX ORDER — SODIUM CHLORIDE, SODIUM LACTATE, POTASSIUM CHLORIDE, CALCIUM CHLORIDE 600; 310; 30; 20 MG/100ML; MG/100ML; MG/100ML; MG/100ML
INJECTION, SOLUTION INTRAVENOUS CONTINUOUS
Status: CANCELLED | OUTPATIENT
Start: 2024-01-16

## 2024-05-07 ENCOUNTER — TELEPHONE (OUTPATIENT)
Age: 44
End: 2024-05-07

## 2024-05-07 NOTE — TELEPHONE ENCOUNTER
Identified patient with two patient identifiers (name and ). Reviewed chart in preparation for encounter and have obtained necessary documentation.     Called and spoke with patient regarding [employer] Insurance requirements. Patient is aware she still needs her EGD and PCP support form. I informed patient I would fax her PCP support form to her PCP verified on file but to follow up with them tomorrow. Patient understood what was discussed and was thankful.

## 2024-05-08 ENCOUNTER — CLINICAL DOCUMENTATION (OUTPATIENT)
Age: 44
End: 2024-05-08

## 2024-08-23 ENCOUNTER — TELEPHONE (OUTPATIENT)
Age: 44
End: 2024-08-23

## 2024-08-23 NOTE — TELEPHONE ENCOUNTER
Attempted to contact pt regarding insurance requirements, LVM for pt to call our office back at their earliest convenience.

## 2025-03-11 ENCOUNTER — TELEPHONE (OUTPATIENT)
Age: 45
End: 2025-03-11

## 2025-04-02 ENCOUNTER — TELEPHONE (OUTPATIENT)
Age: 45
End: 2025-04-02

## 2025-04-02 NOTE — TELEPHONE ENCOUNTER
Attempted to reach patient to schedule bariatric consult with Dr. Martinez. Left VM to return call    Paperwork in chart, ready for scheduling

## 2025-04-07 ENCOUNTER — TELEPHONE (OUTPATIENT)
Age: 45
End: 2025-04-07

## 2025-04-07 NOTE — TELEPHONE ENCOUNTER
Received [employer] message requesting to schedule appointment with [facility]. Contacted patient and sent Psychological Evaluation Assessment via email

## 2025-04-15 ENCOUNTER — OFFICE VISIT (OUTPATIENT)
Age: 45
End: 2025-04-15
Payer: COMMERCIAL

## 2025-04-15 VITALS
WEIGHT: 232 LBS | OXYGEN SATURATION: 96 % | TEMPERATURE: 98.5 F | HEART RATE: 85 BPM | HEIGHT: 64 IN | RESPIRATION RATE: 18 BRPM | SYSTOLIC BLOOD PRESSURE: 121 MMHG | DIASTOLIC BLOOD PRESSURE: 83 MMHG | BODY MASS INDEX: 39.61 KG/M2

## 2025-04-15 DIAGNOSIS — G47.30 SLEEP APNEA, UNSPECIFIED TYPE: ICD-10-CM

## 2025-04-15 DIAGNOSIS — E66.01 MORBID OBESITY (HCC): Primary | ICD-10-CM

## 2025-04-15 DIAGNOSIS — Z78.9 WEIGHT LOSS ADVISED: ICD-10-CM

## 2025-04-15 DIAGNOSIS — E66.01 MORBID OBESITY (HCC): ICD-10-CM

## 2025-04-15 PROCEDURE — 99204 OFFICE O/P NEW MOD 45 MIN: CPT | Performed by: SURGERY

## 2025-04-15 ASSESSMENT — PATIENT HEALTH QUESTIONNAIRE - PHQ9
SUM OF ALL RESPONSES TO PHQ QUESTIONS 1-9: 0
2. FEELING DOWN, DEPRESSED OR HOPELESS: NOT AT ALL
SUM OF ALL RESPONSES TO PHQ QUESTIONS 1-9: 0
SUM OF ALL RESPONSES TO PHQ QUESTIONS 1-9: 0
1. LITTLE INTEREST OR PLEASURE IN DOING THINGS: NOT AT ALL
SUM OF ALL RESPONSES TO PHQ QUESTIONS 1-9: 0

## 2025-04-15 NOTE — PROGRESS NOTES
Identified patient with two patient identifiers (name and ). Reviewed chart in preparation for visit and have obtained necessary documentation.    James Arenas is a 44 y.o. female  Chief Complaint   Patient presents with    New Patient     Bariatric     /83 (BP Site: Left Upper Arm, Patient Position: Sitting, BP Cuff Size: Large Adult)   Pulse 85   Temp 98.5 °F (36.9 °C) (Oral)   Resp 18   Ht 1.626 m (5' 4\")   Wt 105.2 kg (232 lb)   SpO2 96%   BMI 39.82 kg/m²     1. Have you been to the ER, urgent care clinic since your last visit?  Hospitalized since your last visit?no    2. Have you seen or consulted any other health care providers outside of the [facility] since your last visit?  Include any pap smears or colon screening. No    Neck circumference: 14 inches   
[facility]  Dr. Margarita Martinez  [address], [place] [address]     Bariatric Surgery Consultation    CC: obesity    Subjective:     The patient is a 44 y.o. obese female with a Body mass index is 39.82 kg/m². They have been considering surgery for some time now. The patient presents to the clinic today to discuss surgical weight loss options. They have made multiple attempts at weight loss over the years without success, including strict diets and exercise. Unfortunately, none of these have lead to meaningful, sustained weight loss.  Relevant previous surgical history includes: none. They have attended the bariatric seminar prior to this office visit. The patient's goals for the surgery include to lose weight and feel healthier.    Tobacco use: never  GERD/hiatal hernia: none    Sleep Apnea Assessment:     STOPBANG questionnaire  Do you Snore loudly? no  Do you often feel Tired, fatigued, or sleepy during the daytime? no  Has anyone Observed you stop breathing during your sleep? no  Are you being treated for high blood pressure? no  BMI more than 35 kg/m2? yes  Age over 50 years old? no  Neck Circumference >16 inches? no  Gender male? no  ______________________________________     SCORE: 1     If YES to 0 - 2, low risk of sleep apnea  If YES to 3 - 4 intermediate risk of having sleep apnea  If YES to 5 - 8 high risk of having sleep apnea (or 2 + BMI 35 or Neck > 17\" or Male)     Comorbidities:     Bariatric comorbidities present: none    Ambulatory status: independent    The patient's reported level of exercise: not active.    Past Medical History:   Diagnosis Date    Diabetes (HCC)     Obesity     Sleep apnea      History reviewed. No pertinent surgical history.     Social History     Tobacco Use    Smoking status: Never    Smokeless tobacco: Never   Substance Use Topics    Alcohol use: Never      Family History   Problem Relation Age of Onset    Cancer 
No

## 2025-04-17 ENCOUNTER — OFFICE VISIT (OUTPATIENT)
Age: 45
End: 2025-04-17

## 2025-04-17 DIAGNOSIS — E66.01 MORBID OBESITY: Primary | ICD-10-CM

## 2025-04-17 NOTE — PROGRESS NOTES
Pre-operative Bariatric Nutrition Evaluation    Date: 2025              Session 1     Insurance: [facility]            Physician/Surgeon: Dr. Lizy Martinez    James Arenas was evaluated through a synchronous (real-time) audio encounter. Patient identification was verified at the start of the visit.    The patient was located at Home: [address] [place] [id number].  The provider was located in [place], [place]  Confirm you are appropriately licensed, registered, or certified to deliver care in the state where the patient is located as indicated above. If you are not or unsure, please re-schedule the visit: Yes, I confirm.     Name: James Arenas  :  1980  Age:  44  Gender: Female  Type of Surgery: [x]   Gastric Bypass   []    Sleeve Gastrectomy    ASSESSMENT:    Past Medical History: DM     Medications/Supplements:   Prior to Admission medications    Medication Sig Start Date End Date Taking? Authorizing Provider   ondansetron (ZOFRAN) 4 MG tablet Take 1 tablet by mouth every 8 hours as needed for Nausea or Vomiting 7/10/23   Marck Díaz MD   famotidine (PEPCID) 20 MG tablet Take 1 tablet by mouth 2 times daily for 7 days 7/10/23 7/17/23  Marck Díaz MD   ibuprofen (ADVIL;MOTRIN) 600 MG tablet Take 1 tablet by mouth every 8 hours as needed for Pain 7/10/23   Marck Díaz MD   Acetaminophen (TYLENOL) 325 MG CAPS Take 650 mg by mouth every 6 hours as needed (for pain or fever) 7/10/23   Marck Díaz MD   Lancets MISC Test blood sugar twice a day.. E11.65 3/22/23   Automatic Reconciliation, Ar   ergocalciferol (ERGOCALCIFEROL) 1.25 MG (35106 UT) capsule Take 1 capsule by mouth every 7 days 23   Automatic Reconciliation, Ar     Smoking: None  Alcohol: None    Food Allergies/Intolerances: None    Anthropometrics:    Ht: 64 inches  Wt: 232 lbs    IBW: 120 lbs    %IBW: 193     BMI: 39  Category: Obesity class II    Reported wt history: Pt presents today for pre-op nutrition evaluation for wt

## 2025-04-22 DIAGNOSIS — E66.01 MORBID OBESITY (HCC): ICD-10-CM

## 2025-04-22 NOTE — PATIENT INSTRUCTIONS
Version: 14.4  © 3318-6059 [employer].   Care instructions adapted under license by [employer]. If you have questions about a medical condition or this instruction, always ask your healthcare professional. [employer], [email or web address], disclaims any warranty or liability for your use of this information.

## 2025-04-23 ENCOUNTER — TELEPHONE (OUTPATIENT)
Age: 45
End: 2025-04-23

## 2025-04-30 ENCOUNTER — TELEPHONE (OUTPATIENT)
Age: 45
End: 2025-04-30

## 2025-04-30 NOTE — TELEPHONE ENCOUNTER
Identified patient with two patient identifiers (name and ). Reviewed chart in preparation for encounter and have obtained necessary documentation.    Called and spoke with patient regarding swl requirements, patient aware of requirements needed.

## 2025-05-23 ENCOUNTER — TELEPHONE (OUTPATIENT)
Age: 45
End: 2025-05-23

## 2025-05-23 NOTE — TELEPHONE ENCOUNTER
Called to schedule midway around June or July with JUN Stanley. First visit was 4.15.25. Left message on machine for patient to give me a call back at her earliest convenience. I will try back on Tuesday if patient does not return a call today before closing hours. - DCR

## 2025-07-25 ENCOUNTER — TELEPHONE (OUTPATIENT)
Age: 45
End: 2025-07-25